# Patient Record
Sex: MALE | Race: WHITE | NOT HISPANIC OR LATINO | Employment: FULL TIME | URBAN - METROPOLITAN AREA
[De-identification: names, ages, dates, MRNs, and addresses within clinical notes are randomized per-mention and may not be internally consistent; named-entity substitution may affect disease eponyms.]

---

## 2022-08-07 ENCOUNTER — OFFICE VISIT (OUTPATIENT)
Dept: URGENT CARE | Facility: URGENT CARE | Age: 42
End: 2022-08-07
Payer: COMMERCIAL

## 2022-08-07 VITALS
DIASTOLIC BLOOD PRESSURE: 82 MMHG | TEMPERATURE: 98.8 F | OXYGEN SATURATION: 97 % | HEART RATE: 73 BPM | SYSTOLIC BLOOD PRESSURE: 130 MMHG

## 2022-08-07 DIAGNOSIS — R21 RASH OF GENITAL AREA: ICD-10-CM

## 2022-08-07 DIAGNOSIS — K13.79 MOUTH SORES: ICD-10-CM

## 2022-08-07 DIAGNOSIS — U07.1 INFECTION DUE TO 2019 NOVEL CORONAVIRUS: Primary | ICD-10-CM

## 2022-08-07 PROCEDURE — 99203 OFFICE O/P NEW LOW 30 MIN: CPT | Performed by: FAMILY MEDICINE

## 2022-08-07 RX ORDER — DIPHENHYDRAMINE HYDROCHLORIDE AND LIDOCAINE HYDROCHLORIDE AND ALUMINUM HYDROXIDE AND MAGNESIUM HYDRO
10 KIT EVERY 6 HOURS PRN
Qty: 237 ML | Refills: 0 | Status: SHIPPED | OUTPATIENT
Start: 2022-08-07 | End: 2022-08-07

## 2022-08-07 RX ORDER — SULFAMETHOXAZOLE/TRIMETHOPRIM 800-160 MG
1 TABLET ORAL 2 TIMES DAILY
Qty: 20 TABLET | Refills: 0 | Status: SHIPPED | OUTPATIENT
Start: 2022-08-07

## 2022-08-07 RX ORDER — SULFAMETHOXAZOLE/TRIMETHOPRIM 800-160 MG
1 TABLET ORAL 2 TIMES DAILY
Qty: 20 TABLET | Refills: 0 | Status: SHIPPED | OUTPATIENT
Start: 2022-08-07 | End: 2022-08-07

## 2022-08-07 RX ORDER — DIPHENHYDRAMINE HYDROCHLORIDE AND LIDOCAINE HYDROCHLORIDE AND ALUMINUM HYDROXIDE AND MAGNESIUM HYDRO
10 KIT EVERY 6 HOURS PRN
Qty: 237 ML | Refills: 0 | Status: SHIPPED | OUTPATIENT
Start: 2022-08-07

## 2022-08-07 NOTE — LETTER
August 7, 2022      Miguel Quiñonez  [address]  [place] [address]        To Whom It May Concern:    Miguel Quiñonez  was seen on 8/7.  Please excuse him from work thru 8/8, he may require extension of work excuse thru 8/13 due to illness, positive COVID infection and mouth sores due to COVID infection.        Sincerely,        Magno Antony MD

## 2022-08-07 NOTE — PROGRESS NOTES
SUBJECTIVE:  Chief Complaint   Patient presents with     Mouth Problem     2 days, roof of mouth swelling/sores, skin on penis flaking off turning black/bluish, hands and feet feels tingly, tested positive for covid on Thursday     Miguel Quiñonez is a 41 year old male who presents with a chief complaint of rash - mouth, genital area.    Home rapid COVID positive on 8/4, exposed to positive COVID the weekend before, started to develop symptoms on Wednesday.  Has cough but has improved.    Developed rash in mouth and in genital area for 2 days.  Has tried lotrimin in the past for rash in genital area and has helped, did seem to be more moist but thinks may be due to taking more baths, skin more raw and feels more tingling.    Has been more stressed, unable to sleep due to pain    No past medical history on file.  No current outpatient medications on file.     Social History     Tobacco Use     Smoking status: Current Every Day Smoker     Smokeless tobacco: Never Used   Substance Use Topics     Alcohol use: Not on file       ROS:  Review of systems negative except as stated above.    EXAM:   /82 (BP Location: Right arm, Patient Position: Sitting, Cuff Size: Adult Regular)   Pulse 73   Temp 98.8  F (37.1  C) (Tympanic)   SpO2 97%   GENERAL APPEARANCE: healthy, alert and no distress  EYES: EOM intact, PERRL, conjunctiva clear  MOUTH: irregular patches on lower lip, upper lip and mucosa in mouth.  No lip or tongue swelling  CHEST: no audible wheezes or increase work of breathing  EXTREMITIES: peripheral pulses normal  SKIN: penis - head of penis along corona edge with denuded patch with slight mattering, slight detracted tissue, tenderness and faint erythema  PSYCH: alert, affect bright      ASSESSMENT/PLAN:  (U07.1) Infection due to 2019 novel coronavirus  (primary encounter diagnosis)  Plan: symptomatic treatment    (K13.79) Mouth sores  Plan: magic mouthwash suspension, diphenhydrAMINE,         lidocaine,  aluminum-magnesium & simethicone,         (FIRST-MOUTHWASH BLM) compounding kit,         DISCONTINUED: magic mouthwash suspension,         diphenhydrAMINE, lidocaine, aluminum-magnesium         & simethicone, (FIRST-MOUTHWASH BLM)         compounding kit            (R21) Rash of genital area  Plan: sulfamethoxazole-trimethoprim (BACTRIM DS)         800-160 MG tablet, DISCONTINUED:         sulfamethoxazole-trimethoprim (BACTRIM DS)         800-160 MG tablet            Patient with COVID-19 infection with skin manifestation in mouth and on genital areas, concerning for mild Donald Marco Antonio like reaction.  Will treat with RX magic mouthwash for now, encourage tylenol and ibuprofen for discomfort.  RX Bactrim DS given for skin wound in genital area concerning more for skin infection.    Work excuse note given  Follow up with primary provider if no improvement of symptoms in 1 week    Magno Antony MD  August 7, 2022 5:28 PM

## 2022-08-07 NOTE — PATIENT INSTRUCTIONS
Okay to take ibuprofen 200 mg - 4 tablets (800 mg) every 8 hours as needed.  Okay to take tylenol 500 mg - 2 tablets (1000 mg) every 6-8 hours as needed, do not exceed 3000 mg in 24 hours.    Use magic mouthwash to help with mouth sores  Take full course of antibiotic for possible skin infection

## 2022-09-20 ENCOUNTER — OFFICE VISIT (OUTPATIENT)
Dept: FAMILY MEDICINE | Facility: CLINIC | Age: 42
End: 2022-09-20
Payer: COMMERCIAL

## 2022-09-20 VITALS
OXYGEN SATURATION: 96 % | HEART RATE: 72 BPM | TEMPERATURE: 97.8 F | WEIGHT: 124 LBS | SYSTOLIC BLOOD PRESSURE: 111 MMHG | DIASTOLIC BLOOD PRESSURE: 75 MMHG | BODY MASS INDEX: 21.17 KG/M2 | HEIGHT: 64 IN

## 2022-09-20 DIAGNOSIS — B02.9 HERPES ZOSTER WITHOUT COMPLICATION: ICD-10-CM

## 2022-09-20 DIAGNOSIS — B09 VIRAL RASH: Primary | ICD-10-CM

## 2022-09-20 LAB
ANION GAP SERPL CALCULATED.3IONS-SCNC: 7 MMOL/L (ref 3–14)
BASOPHILS # BLD AUTO: 0 10E3/UL (ref 0–0.2)
BASOPHILS NFR BLD AUTO: 0 %
BUN SERPL-MCNC: 7 MG/DL (ref 7–30)
CALCIUM SERPL-MCNC: 8.9 MG/DL (ref 8.5–10.1)
CHLORIDE BLD-SCNC: 103 MMOL/L (ref 94–109)
CO2 SERPL-SCNC: 26 MMOL/L (ref 20–32)
CREAT SERPL-MCNC: 1.02 MG/DL (ref 0.66–1.25)
EOSINOPHIL # BLD AUTO: 0.1 10E3/UL (ref 0–0.7)
EOSINOPHIL NFR BLD AUTO: 1 %
ERYTHROCYTE [DISTWIDTH] IN BLOOD BY AUTOMATED COUNT: 13.8 % (ref 10–15)
GFR SERPL CREATININE-BSD FRML MDRD: >90 ML/MIN/1.73M2
GLUCOSE BLD-MCNC: 91 MG/DL (ref 70–99)
HCT VFR BLD AUTO: 44 % (ref 40–53)
HGB BLD-MCNC: 15 G/DL (ref 13.3–17.7)
LYMPHOCYTES # BLD AUTO: 1 10E3/UL (ref 0.8–5.3)
LYMPHOCYTES NFR BLD AUTO: 24 %
MCH RBC QN AUTO: 28.1 PG (ref 26.5–33)
MCHC RBC AUTO-ENTMCNC: 34.1 G/DL (ref 31.5–36.5)
MCV RBC AUTO: 83 FL (ref 78–100)
MONOCYTES # BLD AUTO: 0.5 10E3/UL (ref 0–1.3)
MONOCYTES NFR BLD AUTO: 13 %
NEUTROPHILS # BLD AUTO: 2.6 10E3/UL (ref 1.6–8.3)
NEUTROPHILS NFR BLD AUTO: 62 %
PLATELET # BLD AUTO: 130 10E3/UL (ref 150–450)
POTASSIUM BLD-SCNC: 3.8 MMOL/L (ref 3.4–5.3)
RBC # BLD AUTO: 5.33 10E6/UL (ref 4.4–5.9)
SODIUM SERPL-SCNC: 136 MMOL/L (ref 133–144)
WBC # BLD AUTO: 4.2 10E3/UL (ref 4–11)

## 2022-09-20 PROCEDURE — 99203 OFFICE O/P NEW LOW 30 MIN: CPT

## 2022-09-20 PROCEDURE — 80048 BASIC METABOLIC PNL TOTAL CA: CPT

## 2022-09-20 PROCEDURE — 86696 HERPES SIMPLEX TYPE 2 TEST: CPT | Performed by: PHYSICIAN ASSISTANT

## 2022-09-20 PROCEDURE — 86695 HERPES SIMPLEX TYPE 1 TEST: CPT | Performed by: PHYSICIAN ASSISTANT

## 2022-09-20 PROCEDURE — 36415 COLL VENOUS BLD VENIPUNCTURE: CPT

## 2022-09-20 PROCEDURE — 85025 COMPLETE CBC W/AUTO DIFF WBC: CPT

## 2022-09-20 RX ORDER — VALACYCLOVIR HYDROCHLORIDE 1 G/1
1000 TABLET, FILM COATED ORAL 3 TIMES DAILY
Qty: 21 TABLET | Refills: 0 | Status: SHIPPED | OUTPATIENT
Start: 2022-09-20 | End: 2022-09-27

## 2022-09-20 ASSESSMENT — ENCOUNTER SYMPTOMS
NEUROLOGICAL NEGATIVE: 1
CONSTITUTIONAL NEGATIVE: 1

## 2022-09-20 NOTE — PROGRESS NOTES
"Assessment & Plan     Viral rash  - CBC with platelets differential  - Basic metabolic panel  - Herpes Simplex Virus 1 and 2 IgG  - valACYclovir (VALTREX) 1000 mg tablet  Dispense: 21 tablet; Refill: 0  - CBC with platelets differential  - Basic metabolic panel  - Herpes Simplex Virus 1 and 2 IgG    Herpes zoster without complication     Due to history and recent second outbreak of viral rash, will get basic lab work. At this time, to take Valtrex as directed, monitor for any new or worsening symptoms.     Return if symptoms worsen or fail to improve, for Follow up.    Shannan Rivera is a 41 year old male who presents to clinic today for the following health issues:  Chief Complaint   Patient presents with     Derm Problem     Blister on left inner thigh, spreading redness, blisters         Miguel presents with reports of blisters on his left inner thigh with redness and it is spreading x 3 days. He states 1.5 months ago he had rash in his mouth and genitals, was treated and improved. He reports he has had decreased appetite.           Review of Systems   Constitutional: Negative.    Skin: Positive for rash.   Neurological: Negative.            Objective    /75 (BP Location: Right arm, Patient Position: Chair, Cuff Size: Adult Regular)   Pulse 72   Temp 97.8  F (36.6  C) (Tympanic)   Ht 1.626 m (5' 4\")   Wt 56.2 kg (124 lb)   SpO2 96%   BMI 21.28 kg/m    Physical Exam  Constitutional:       Appearance: Normal appearance.   HENT:      Head: Normocephalic and atraumatic.   Musculoskeletal:         General: Normal range of motion.      Cervical back: Normal range of motion and neck supple.   Skin:     Findings: Rash present. Rash is vesicular.          Neurological:      General: No focal deficit present.      Mental Status: He is alert and oriented to person, place, and time.   Psychiatric:         Mood and Affect: Mood normal.         Behavior: Behavior normal.         Thought Content: Thought content " normal.         Judgment: Judgment normal.              Tyler Valero PA-C

## 2022-09-21 LAB
HSV1 IGG SERPL QL IA: <0.01 INDEX
HSV1 IGG SERPL QL IA: NORMAL
HSV2 IGG SERPL QL IA: 0.05 INDEX
HSV2 IGG SERPL QL IA: NORMAL

## 2022-10-16 ENCOUNTER — HEALTH MAINTENANCE LETTER (OUTPATIENT)
Age: 42
End: 2022-10-16

## 2023-11-04 ENCOUNTER — HEALTH MAINTENANCE LETTER (OUTPATIENT)
Age: 43
End: 2023-11-04

## 2024-12-22 ENCOUNTER — HEALTH MAINTENANCE LETTER (OUTPATIENT)
Age: 44
End: 2024-12-22

## 2025-05-03 ENCOUNTER — HOSPITAL ENCOUNTER (EMERGENCY)
Facility: HOSPITAL | Age: 45
Discharge: HOME | End: 2025-05-03
Attending: EMERGENCY MEDICINE | Admitting: EMERGENCY MEDICINE
Payer: COMMERCIAL

## 2025-05-03 ENCOUNTER — APPOINTMENT (OUTPATIENT)
Dept: CT IMAGING | Facility: HOSPITAL | Age: 45
End: 2025-05-03
Attending: EMERGENCY MEDICINE
Payer: COMMERCIAL

## 2025-05-03 VITALS
RESPIRATION RATE: 28 BRPM | OXYGEN SATURATION: 94 % | SYSTOLIC BLOOD PRESSURE: 123 MMHG | WEIGHT: 138.9 LBS | TEMPERATURE: 98.5 F | DIASTOLIC BLOOD PRESSURE: 78 MMHG | HEART RATE: 94 BPM | BODY MASS INDEX: 23.84 KG/M2

## 2025-05-03 DIAGNOSIS — J18.9 COMMUNITY ACQUIRED PNEUMONIA OF LEFT LOWER LOBE OF LUNG: ICD-10-CM

## 2025-05-03 DIAGNOSIS — R59.1 LYMPHADENOPATHY: ICD-10-CM

## 2025-05-03 LAB
ALBUMIN SERPL BCG-MCNC: 4.1 G/DL (ref 3.5–5.2)
ALP SERPL-CCNC: 223 U/L (ref 40–150)
ALT SERPL W P-5'-P-CCNC: 27 U/L (ref 0–70)
ANION GAP SERPL CALCULATED.3IONS-SCNC: 15 MMOL/L (ref 7–15)
AST SERPL W P-5'-P-CCNC: 22 U/L (ref 0–45)
BASOPHILS # BLD AUTO: 0 10E3/UL (ref 0–0.2)
BASOPHILS NFR BLD AUTO: 0 %
BILIRUB DIRECT SERPL-MCNC: 0.83 MG/DL (ref 0–0.3)
BILIRUB SERPL-MCNC: 1.9 MG/DL
BUN SERPL-MCNC: 10.7 MG/DL (ref 6–20)
CALCIUM SERPL-MCNC: 9.8 MG/DL (ref 8.8–10.4)
CHLORIDE SERPL-SCNC: 93 MMOL/L (ref 98–107)
CREAT SERPL-MCNC: 0.9 MG/DL (ref 0.67–1.17)
D DIMER PPP FEU-MCNC: 1.28 UG/ML FEU (ref 0–0.5)
EGFRCR SERPLBLD CKD-EPI 2021: >90 ML/MIN/1.73M2
EOSINOPHIL # BLD AUTO: 0 10E3/UL (ref 0–0.7)
EOSINOPHIL NFR BLD AUTO: 0 %
ERYTHROCYTE [DISTWIDTH] IN BLOOD BY AUTOMATED COUNT: 14.7 % (ref 10–15)
GLUCOSE SERPL-MCNC: 119 MG/DL (ref 70–99)
HCO3 SERPL-SCNC: 26 MMOL/L (ref 22–29)
HCT VFR BLD AUTO: 42.9 % (ref 40–53)
HGB BLD-MCNC: 13.7 G/DL (ref 13.3–17.7)
HOLD SPECIMEN: NORMAL
IMM GRANULOCYTES # BLD: 0.1 10E3/UL
IMM GRANULOCYTES NFR BLD: 1 %
LIPASE SERPL-CCNC: 104 U/L (ref 13–60)
LYMPHOCYTES # BLD AUTO: 1.1 10E3/UL (ref 0.8–5.3)
LYMPHOCYTES NFR BLD AUTO: 10 %
MCH RBC QN AUTO: 24.3 PG (ref 26.5–33)
MCHC RBC AUTO-ENTMCNC: 31.9 G/DL (ref 31.5–36.5)
MCV RBC AUTO: 76 FL (ref 78–100)
MONOCYTES # BLD AUTO: 0.8 10E3/UL (ref 0–1.3)
MONOCYTES NFR BLD AUTO: 8 %
NEUTROPHILS # BLD AUTO: 8.7 10E3/UL (ref 1.6–8.3)
NEUTROPHILS NFR BLD AUTO: 81 %
NRBC # BLD AUTO: 0 10E3/UL
NRBC BLD AUTO-RTO: 0 /100
PLATELET # BLD AUTO: 210 10E3/UL (ref 150–450)
POTASSIUM SERPL-SCNC: 3.9 MMOL/L (ref 3.4–5.3)
PROT SERPL-MCNC: 6.6 G/DL (ref 6.4–8.3)
RBC # BLD AUTO: 5.63 10E6/UL (ref 4.4–5.9)
SODIUM SERPL-SCNC: 134 MMOL/L (ref 135–145)
TROPONIN T SERPL HS-MCNC: 7 NG/L
WBC # BLD AUTO: 10.7 10E3/UL (ref 4–11)

## 2025-05-03 PROCEDURE — 93005 ELECTROCARDIOGRAM TRACING: CPT | Performed by: EMERGENCY MEDICINE

## 2025-05-03 PROCEDURE — 82248 BILIRUBIN DIRECT: CPT | Performed by: EMERGENCY MEDICINE

## 2025-05-03 PROCEDURE — 71275 CT ANGIOGRAPHY CHEST: CPT

## 2025-05-03 PROCEDURE — 84484 ASSAY OF TROPONIN QUANT: CPT | Performed by: EMERGENCY MEDICINE

## 2025-05-03 PROCEDURE — 85004 AUTOMATED DIFF WBC COUNT: CPT | Performed by: EMERGENCY MEDICINE

## 2025-05-03 PROCEDURE — 80048 BASIC METABOLIC PNL TOTAL CA: CPT | Performed by: EMERGENCY MEDICINE

## 2025-05-03 PROCEDURE — 99285 EMERGENCY DEPT VISIT HI MDM: CPT | Mod: 25

## 2025-05-03 PROCEDURE — 250N000013 HC RX MED GY IP 250 OP 250 PS 637: Performed by: EMERGENCY MEDICINE

## 2025-05-03 PROCEDURE — 85379 FIBRIN DEGRADATION QUANT: CPT | Performed by: EMERGENCY MEDICINE

## 2025-05-03 PROCEDURE — 250N000011 HC RX IP 250 OP 636: Performed by: EMERGENCY MEDICINE

## 2025-05-03 PROCEDURE — 83690 ASSAY OF LIPASE: CPT | Performed by: EMERGENCY MEDICINE

## 2025-05-03 PROCEDURE — 36415 COLL VENOUS BLD VENIPUNCTURE: CPT | Performed by: EMERGENCY MEDICINE

## 2025-05-03 RX ORDER — CEFPODOXIME PROXETIL 200 MG/1
200 TABLET, FILM COATED ORAL ONCE
Status: COMPLETED | OUTPATIENT
Start: 2025-05-03 | End: 2025-05-03

## 2025-05-03 RX ORDER — AZITHROMYCIN 250 MG/1
TABLET, FILM COATED ORAL
Qty: 6 TABLET | Refills: 0 | Status: SHIPPED | OUTPATIENT
Start: 2025-05-03 | End: 2025-05-08

## 2025-05-03 RX ORDER — IOPAMIDOL 755 MG/ML
90 INJECTION, SOLUTION INTRAVASCULAR ONCE
Status: COMPLETED | OUTPATIENT
Start: 2025-05-03 | End: 2025-05-03

## 2025-05-03 RX ORDER — CEFPODOXIME PROXETIL 200 MG/1
200 TABLET, FILM COATED ORAL 2 TIMES DAILY
Qty: 20 TABLET | Refills: 0 | Status: SHIPPED | OUTPATIENT
Start: 2025-05-03 | End: 2025-05-13

## 2025-05-03 RX ORDER — CEFDINIR 300 MG/1
300 CAPSULE ORAL ONCE
Status: DISCONTINUED | OUTPATIENT
Start: 2025-05-03 | End: 2025-05-03

## 2025-05-03 RX ADMIN — CEFPODOXIME PROXETIL 200 MG: 200 TABLET, FILM COATED ORAL at 09:23

## 2025-05-03 RX ADMIN — IOPAMIDOL 90 ML: 755 INJECTION, SOLUTION INTRAVENOUS at 08:00

## 2025-05-03 ASSESSMENT — ACTIVITIES OF DAILY LIVING (ADL)
ADLS_ACUITY_SCORE: 41

## 2025-05-03 NOTE — DISCHARGE INSTRUCTIONS
Our primary care and heme/onc scheduling teams will call you to help you to set up follow up appointments with them so they can reassess you and make sure you are improving on the antibiotic therapy that you started in the ER today and so they can talk with you about whether they recommend any other procedures (a repeat CAT scan or lymph node testing) to make sure you fully recovered from your pneumonia and to make sure you don't have any other abnormalities with your lymph node system.

## 2025-05-03 NOTE — ED PROVIDER NOTES
EMERGENCY DEPARTMENT ENCOUNTER      NAME: Miguel Quiñonez  AGE: 44 year old male  YOB: 1980  MRN: [id number]  EVALUATION DATE & TIME: 5/3/2025  6:48 AM    PCP: No Ref-Primary, Physician    ED PROVIDER: Davida Irby M.D.      Chief Complaint   Patient presents with    Chest Pain    Shortness of Breath         FINAL IMPRESSION:  1. Community acquired pneumonia of left lower lobe of lung    2. Lymphadenopathy          ED COURSE & MEDICAL DECISION MAKING:    ED Course as of 05/03/25 0854   Sat May 03, 2025   0707 Patient with PERC+ moderate risk per Wells PE score for PE, HEART score low risk for ACS, and with moderate risk a negative ddimer would not be sufficient to r/o PE so CTA pending. Troponin testing pending for ACS r/o, and EKG reassuring, chemistry and LFTs/lipase pending additionally.  with stress of triage, now in 90s.    0832 Ddimer elevated and CTA pending, lipase only 104 without focal RUQ pain and LFTs normal and CBC and BMP WNL   0833 CT with likely early developing PNA LLL with hot/cold  feeling started on augmentin with z-pack. With lymphadenopathy, possibly reactive to PNA vs other lymphoproliferative pathology therefore will refer to heme/onc for close serial reassessment and follow up.    0845 Patietn with PCN allergy so will prescribe cefpodozime with z-pack       Pertinent Labs & Imaging studies reviewed. (See chart for details)    Medical Decision Making      Discharge. I prescribed additional prescription strength medication(s) as charted. I considered admission, but discharged patient after significant clinical improvement.    MIPS (CTPE, Dental pain, Duff, Sinusitis, Asthma/COPD, Head Trauma): CT Pulmonary Angiogram:The patient had an abnormal d-dimer.    SEPSIS: None        At the conclusion of the encounter I discussed the results of all of the tests and the disposition. The questions were answered. The patient or family acknowledged understanding and was agreeable with the  care plan.     MEDICATIONS GIVEN IN THE EMERGENCY:  Medications   cefpodoxime (VANTIN) tablet 200 mg (has no administration in time range)   iopamidol (ISOVUE-370) solution 90 mL (90 mLs Intravenous $Given 5/3/25 0800)       NEW PRESCRIPTIONS STARTED AT TODAY'S ER VISIT  New Prescriptions    AZITHROMYCIN (ZITHROMAX Z-JONNY) 250 MG TABLET    Two tablets on the first day, then one tablet daily for the next 4 days    CEFPODOXIME (VANTIN) 200 MG TABLET    Take 1 tablet (200 mg) by mouth 2 times daily for 10 days.          =================================================================    HPI      Miguel Quiñonez is a 44 year old male who presents to the ED today via private vehicle with chest pain.    He reports that since about 3 months ago he has been sleeping somewhat poorly and warm at night, and then on Friday he developed anterior left crampy chest pain radiating into his back, moderately severe, worse with deep breaths and improved with shallow breaths. No prior episodes. No history of blood clots, no recent surgeries or trauma, no leg swelling. He doesn't smoke cigarettes but occasionally smokes THC. Father had MI. He has no history of heart disease.       REVIEW OF SYSTEMS   All other systems reviewed and are negative except as noted above in HPI.    PAST MEDICAL HISTORY:  No past medical history on file.    PAST SURGICAL HISTORY:  No past surgical history on file.    CURRENT MEDICATIONS:    azithromycin (ZITHROMAX Z-JONNY) 250 MG tablet  cefpodoxime (VANTIN) 200 MG tablet  magic mouthwash suspension, diphenhydrAMINE, lidocaine, aluminum-magnesium & simethicone, (FIRST-MOUTHWASH BLM) compounding kit  sulfamethoxazole-trimethoprim (BACTRIM DS) 800-160 MG tablet  valACYclovir (VALTREX) 1000 mg tablet        ALLERGIES:  Allergies   Allergen Reactions    Penicillins Hives       FAMILY HISTORY:  No family history on file.    SOCIAL HISTORY:   Social History     Socioeconomic History    Marital status: Single   Tobacco Use     Smoking status: Every Day    Smokeless tobacco: Never       VITALS:  Patient Vitals for the past 24 hrs:   BP Temp Temp src Pulse Resp SpO2 Weight   05/03/25 0720 120/77 -- -- 97 -- 93 % --   05/03/25 0705 122/76 -- -- 97 28 94 % --   05/03/25 0654 -- 98.5  F (36.9  C) Oral 106 18 98 % 63 kg (138 lb 14.4 oz)       PHYSICAL EXAM    GENERAL: Awake, alert.  In no acute distress.   HEENT: Normocephalic, atraumatic.  Pupils equal, round and reactive.  Conjunctiva normal.  EOMI.  NECK: No stridor or apparent deformity.  PULMONARY: Symmetrical breath sounds without distress.  Lungs clear to auscultation bilaterally without wheezes, rhonchi or rales.  CARDIO: Regular rate and rhythm.  No significant murmur, rub or gallop.  Radial pulses strong and symmetrical.  ABDOMINAL: Abdomen soft, non-distended and non-tender to palpation.  No CVAT, no palpable hepatosplenomegaly.  EXTREMITIES: No lower extremity swelling or edema.    NEURO: Alert and oriented to person, place and time.  Cranial nerves grossly intact.  No focal motor deficit.  PSYCH: Normal mood and affect  SKIN: No rashes      LAB:  All pertinent labs reviewed and interpreted.  Results for orders placed or performed during the hospital encounter of 05/03/25   CT Chest Pulmonary Embolism w Contrast    Impression    IMPRESSION:  1.  No convincing pulmonary embolus.  2.  Left lower lobe pneumonia with small left pleural effusion.  3.  Moderate splenomegaly and upper abdominal/retroperitoneal lymphadenopathy likely reflecting a lymphoproliferative process, although other neoplastic etiologies are possible.   4.  Left hilar and subcarinal lymphadenopathy could be reactive versus associated to Impression#3.      Extra Blue Top Tube   Result Value Ref Range    Hold Specimen JIC    Extra Red Top Tube   Result Value Ref Range    Hold Specimen JIC    Extra Green Top (Lithium Heparin) Tube   Result Value Ref Range    Hold Specimen JIC    Extra Purple Top Tube   Result Value  Ref Range    Hold Specimen [facility]    D dimer quantitative   Result Value Ref Range    D-Dimer Quantitative 1.28 (H) 0.00 - 0.50 ug/mL FEU   Basic metabolic panel   Result Value Ref Range    Sodium 134 (L) 135 - 145 mmol/L    Potassium 3.9 3.4 - 5.3 mmol/L    Chloride 93 (L) 98 - 107 mmol/L    Carbon Dioxide (CO2) 26 22 - 29 mmol/L    Anion Gap 15 7 - 15 mmol/L    Urea Nitrogen 10.7 6.0 - 20.0 mg/dL    Creatinine 0.90 0.67 - 1.17 mg/dL    GFR Estimate >90 >60 mL/min/1.73m2    Calcium 9.8 8.8 - 10.4 mg/dL    Glucose 119 (H) 70 - 99 mg/dL   Result Value Ref Range    Troponin T, High Sensitivity 7 <=22 ng/L   Result Value Ref Range    Lipase 104 (H) 13 - 60 U/L   Hepatic function panel   Result Value Ref Range    Protein Total 6.6 6.4 - 8.3 g/dL    Albumin 4.1 3.5 - 5.2 g/dL    Bilirubin Total 1.9 (H) <=1.2 mg/dL    Alkaline Phosphatase 223 (H) 40 - 150 U/L    AST 22 0 - 45 U/L    ALT 27 0 - 70 U/L    Bilirubin Direct 0.83 (H) 0.00 - 0.30 mg/dL   CBC with platelets and differential   Result Value Ref Range    WBC Count 10.7 4.0 - 11.0 10e3/uL    RBC Count 5.63 4.40 - 5.90 10e6/uL    Hemoglobin 13.7 13.3 - 17.7 g/dL    Hematocrit 42.9 40.0 - 53.0 %    MCV 76 (L) 78 - 100 fL    MCH 24.3 (L) 26.5 - 33.0 pg    MCHC 31.9 31.5 - 36.5 g/dL    RDW 14.7 10.0 - 15.0 %    Platelet Count 210 150 - 450 10e3/uL    % Neutrophils 81 %    % Lymphocytes 10 %    % Monocytes 8 %    % Eosinophils 0 %    % Basophils 0 %    % Immature Granulocytes 1 %    NRBCs per 100 WBC 0 <1 /100    Absolute Neutrophils 8.7 (H) 1.6 - 8.3 10e3/uL    Absolute Lymphocytes 1.1 0.8 - 5.3 10e3/uL    Absolute Monocytes 0.8 0.0 - 1.3 10e3/uL    Absolute Eosinophils 0.0 0.0 - 0.7 10e3/uL    Absolute Basophils 0.0 0.0 - 0.2 10e3/uL    Absolute Immature Granulocytes 0.1 <=0.4 10e3/uL    Absolute NRBCs 0.0 10e3/uL       RADIOLOGY:  Reviewed all pertinent imaging. Please see official radiology report.  CT Chest Pulmonary Embolism w Contrast   Final Result   IMPRESSION:    1.  No convincing pulmonary embolus.   2.  Left lower lobe pneumonia with small left pleural effusion.   3.  Moderate splenomegaly and upper abdominal/retroperitoneal lymphadenopathy likely reflecting a lymphoproliferative process, although other neoplastic etiologies are possible.    4.  Left hilar and subcarinal lymphadenopathy could be reactive versus associated to Impression#3.                EKG:    Reviewed and interpreted as: 0708 normal sinus rhythm without ST abnormalities, , no prior EKG available for comparison purposes      I have independently reviewed and interpreted the EKG(s) documented above.       Davida Irby MD  05/03/25 0883

## 2025-05-03 NOTE — ED TRIAGE NOTES
Patient reports 9/10 crushing left sided chest pain that radiates into back, shortness of breath, nausea. Has been having similar episodes since February. This episode originally started Thursday but has been getting worse and has not gone away.     Triage Assessment (Adult)       Row Name 05/03/25 0651          Triage Assessment    Airway WDL WDL        Respiratory WDL    Respiratory WDL X;rhythm/pattern     Rhythm/Pattern, Respiratory shortness of breath  hard to take deep breath        Cardiac WDL    Cardiac WDL X;chest pain        Chest Pain Assessment    Chest Pain Location anterior chest, left     Chest Pain Radiation back     Character crushing

## 2025-05-05 ENCOUNTER — PATIENT OUTREACH (OUTPATIENT)
Dept: ONCOLOGY | Facility: CLINIC | Age: 45
End: 2025-05-05
Payer: COMMERCIAL

## 2025-05-05 NOTE — PROGRESS NOTES
New Patient Oncology Nurse Navigator Note     Referral Received: 05/05/25      Referring provider: Davida Irby MD     Referring Clinic/Organization: [facility]     Referred to: Malignant Hematology    Requested provider (if applicable): First available - did not specify      Evaluation for :   Diagnosis   R59.1 (ICD-10-CM) - Lymphadenopathy      Clinical History (per Nurse review of records provided):      ED COURSE & MEDICAL DECISION MAKING:        ED Course as of 05/03/25 0854   Sat May 03, 2025   0707 Patient with PERC+ moderate risk per Wells PE score for PE, HEART score low risk for ACS, and with moderate risk a negative ddimer would not be sufficient to r/o PE so CTA pending. Troponin testing pending for ACS r/o, and EKG reassuring, chemistry and LFTs/lipase pending additionally.  with stress of triage, now in 90s.    0832 Ddimer elevated and CTA pending, lipase only 104 without focal RUQ pain and LFTs normal and CBC and BMP WNL   0833 CT with likely early developing PNA LLL with hot/cold  feeling started on augmentin with z-pack. With lymphadenopathy, possibly reactive to PNA vs other lymphoproliferative pathology therefore will refer to heme/onc for close serial reassessment and follow up.    0845 Patietn with PCN allergy so will prescribe cefpodozime with z-pack     EXAM: CT CHEST PULMONARY EMBOLISM WITH CONTRAST  LOCATION: [facility]  DATE: 05/03/2025     INDICATION: Rule out PE left chest.  COMPARISON: None.  TECHNIQUE: CT chest pulmonary angiogram during arterial phase injection of IV contrast. Multiplanar reformats and MIP reconstructions were performed. Dose reduction techniques were used.   CONTRAST: 90 mL of Isovue-370.     FINDINGS:  ANGIOGRAM CHEST: No convincing pulmonary embolus. Thoracic aorta is nonaneurysmal.       LUNGS AND PLEURA: Moderate left lower lobe consolidative opacity with areas that are hypoenhancing. Small left pleural effusion. No  pneumothorax.     MEDIASTINUM/AXILLAE: Trace debris in the trachea. Mildly enlarged left hilar and subcarinal lymph nodes.     CORONARY ARTERY CALCIFICATION: None.     UPPER ABDOMEN: Moderate splenomegaly measuring at least 16 cm craniocaudal dimension. Multiple enlarged upper abdominal and retroperitoneal lymph nodes, for example, a left periaortic lymph node measuring 1.7 x 3.6 cm (5/107).     MUSCULOSKELETAL: Normal.                                                                    IMPRESSION:  1.  No convincing pulmonary embolus.  2.  Left lower lobe pneumonia with small left pleural effusion.  3.  Moderate splenomegaly and upper abdominal/retroperitoneal lymphadenopathy likely reflecting a lymphoproliferative process, although other neoplastic etiologies are possible.   4.  Left hilar and subcarinal lymphadenopathy could be reactive versus associated to Impression#3    Records Location: Epic     Additional testing needed prior to consult:     ?    Referral updates and Plan:     05/05/2025 10:00 AM -  Referral received and reviewed. Attempted to call pt at phone number listed, first number the person answered and stated wrong number.  Left message on mobile.  Sent to NPS to schedule next available after 2 weeks.     DK BrewerN, RN  Hematology/Oncology Nurse Navigator  [facility]  [phone] / [phone]

## 2025-05-13 NOTE — TELEPHONE ENCOUNTER
RECORDS STATUS - ALL OTHER DIAGNOSIS      RECORDS RECEIVED FROM: [facility] - Internal records   DATE RECEIVED: 5/13

## 2025-05-21 ENCOUNTER — PRE VISIT (OUTPATIENT)
Dept: ONCOLOGY | Facility: HOSPITAL | Age: 45
End: 2025-05-21
Payer: COMMERCIAL

## 2025-05-21 ENCOUNTER — ONCOLOGY VISIT (OUTPATIENT)
Dept: ONCOLOGY | Facility: HOSPITAL | Age: 45
End: 2025-05-21
Attending: EMERGENCY MEDICINE
Payer: COMMERCIAL

## 2025-05-21 ENCOUNTER — LAB (OUTPATIENT)
Dept: INFUSION THERAPY | Facility: HOSPITAL | Age: 45
End: 2025-05-21
Attending: INTERNAL MEDICINE
Payer: COMMERCIAL

## 2025-05-21 VITALS
HEART RATE: 92 BPM | WEIGHT: 135.7 LBS | OXYGEN SATURATION: 96 % | BODY MASS INDEX: 22.61 KG/M2 | RESPIRATION RATE: 16 BRPM | HEIGHT: 65 IN | DIASTOLIC BLOOD PRESSURE: 85 MMHG | TEMPERATURE: 99 F | SYSTOLIC BLOOD PRESSURE: 130 MMHG

## 2025-05-21 DIAGNOSIS — R59.1 LYMPHADENOPATHY: ICD-10-CM

## 2025-05-21 LAB
ALBUMIN SERPL BCG-MCNC: 4.2 G/DL (ref 3.5–5.2)
ALP SERPL-CCNC: 208 U/L (ref 40–150)
ALT SERPL W P-5'-P-CCNC: 20 U/L (ref 0–70)
ANION GAP SERPL CALCULATED.3IONS-SCNC: 14 MMOL/L (ref 7–15)
AST SERPL W P-5'-P-CCNC: 16 U/L (ref 0–45)
BASOPHILS # BLD AUTO: 0 10E3/UL (ref 0–0.2)
BASOPHILS NFR BLD AUTO: 1 %
BILIRUB SERPL-MCNC: 0.4 MG/DL
BUN SERPL-MCNC: 7.5 MG/DL (ref 6–20)
CALCIUM SERPL-MCNC: 9.5 MG/DL (ref 8.8–10.4)
CHLORIDE SERPL-SCNC: 101 MMOL/L (ref 98–107)
CREAT SERPL-MCNC: 0.92 MG/DL (ref 0.67–1.17)
EGFRCR SERPLBLD CKD-EPI 2021: >90 ML/MIN/1.73M2
EOSINOPHIL # BLD AUTO: 0.1 10E3/UL (ref 0–0.7)
EOSINOPHIL NFR BLD AUTO: 3 %
ERYTHROCYTE [DISTWIDTH] IN BLOOD BY AUTOMATED COUNT: 14.7 % (ref 10–15)
GLUCOSE SERPL-MCNC: 89 MG/DL (ref 70–99)
HCO3 SERPL-SCNC: 27 MMOL/L (ref 22–29)
HCT VFR BLD AUTO: 42.5 % (ref 40–53)
HGB BLD-MCNC: 13.6 G/DL (ref 13.3–17.7)
IMM GRANULOCYTES # BLD: 0 10E3/UL
IMM GRANULOCYTES NFR BLD: 0 %
LDH SERPL L TO P-CCNC: 169 U/L (ref 0–250)
LYMPHOCYTES # BLD AUTO: 1.2 10E3/UL (ref 0.8–5.3)
LYMPHOCYTES NFR BLD AUTO: 33 %
MCH RBC QN AUTO: 24.2 PG (ref 26.5–33)
MCHC RBC AUTO-ENTMCNC: 32 G/DL (ref 31.5–36.5)
MCV RBC AUTO: 76 FL (ref 78–100)
MONOCYTES # BLD AUTO: 0.3 10E3/UL (ref 0–1.3)
MONOCYTES NFR BLD AUTO: 9 %
NEUTROPHILS # BLD AUTO: 2 10E3/UL (ref 1.6–8.3)
NEUTROPHILS NFR BLD AUTO: 55 %
NRBC # BLD AUTO: 0 10E3/UL
NRBC BLD AUTO-RTO: 0 /100
PLATELET # BLD AUTO: 237 10E3/UL (ref 150–450)
POTASSIUM SERPL-SCNC: 3.8 MMOL/L (ref 3.4–5.3)
PROT SERPL-MCNC: 6.1 G/DL (ref 6.4–8.3)
RBC # BLD AUTO: 5.62 10E6/UL (ref 4.4–5.9)
SODIUM SERPL-SCNC: 142 MMOL/L (ref 135–145)
WBC # BLD AUTO: 3.6 10E3/UL (ref 4–11)

## 2025-05-21 PROCEDURE — 99214 OFFICE O/P EST MOD 30 MIN: CPT | Performed by: INTERNAL MEDICINE

## 2025-05-21 PROCEDURE — G2211 COMPLEX E/M VISIT ADD ON: HCPCS | Performed by: INTERNAL MEDICINE

## 2025-05-21 PROCEDURE — 80053 COMPREHEN METABOLIC PANEL: CPT

## 2025-05-21 PROCEDURE — 85004 AUTOMATED DIFF WBC COUNT: CPT

## 2025-05-21 PROCEDURE — 99204 OFFICE O/P NEW MOD 45 MIN: CPT | Performed by: INTERNAL MEDICINE

## 2025-05-21 PROCEDURE — 36415 COLL VENOUS BLD VENIPUNCTURE: CPT

## 2025-05-21 PROCEDURE — 83615 LACTATE (LD) (LDH) ENZYME: CPT

## 2025-05-21 ASSESSMENT — PAIN SCALES - GENERAL: PAINLEVEL_OUTOF10: MILD PAIN (3)

## 2025-05-21 NOTE — PROGRESS NOTES
"Oncology Rooming Note    May 21, 2025 1:18 PM   Miguel Quiñonez is a 44 year old male who presents for:    Chief Complaint   Patient presents with    Oncology Clinic Visit     New Oncology-Lymphadenopathy.      Initial Vitals: /85 (BP Location: Left arm, Patient Position: Sitting, Cuff Size: Adult Regular)   Pulse 92   Temp 99  F (37.2  C) (Oral)   Resp 16   Ht 1.651 m (5' 5\")   Wt 61.6 kg (135 lb 11.2 oz)   SpO2 96%   BMI 22.58 kg/m   Estimated body mass index is 22.58 kg/m  as calculated from the following:    Height as of this encounter: 1.651 m (5' 5\").    Weight as of this encounter: 61.6 kg (135 lb 11.2 oz). Body surface area is 1.68 meters squared.  Mild Pain (3) Comment: Data Unavailable   No LMP for male patient.  Allergies reviewed: Yes  Medications reviewed: Yes    Medications: Medication refills not needed today.  Pharmacy name entered into [facility]:    [id number] - [place], [place] - [address] AT [id number] - [place], [place] - [facility] [address] [address] AT [address] [address] & [facility]    Frailty Screening:   Is the patient here for a new oncology consult visit in cancer care? 1. Yes. Over the past month, have you experienced difficulty or required a caregiver to assist with:   1. Balance, walking or general mobility (including any falls)? NO  2. Completion of self-care tasks such as bathing, dressing, toileting, grooming/hygiene?  NO  3. Concentration or memory that affects your daily life?  NO     PHQ9:  Did this patient require a PHQ9?: No      Clinical concerns: none       Pascale Ansari, ADELAIDA              "

## 2025-05-21 NOTE — PROGRESS NOTES
[facility] Hematology and Oncology Consult Note    Patient: Miguel Quiñonez  MRN: [id number]  Date of Service: May 21, 2025      We have been asked by Dr. Irby to evaluate Miguel Quiñonez for lymphadenopathy.    Assessment/Plan:    1.  Lymphadenopathy and splenomegaly: I reviewed his CT scan images from 23rd personally.  I shared them with Miguel and interpreted them for him.  We talked about the possible etiologies splenomegaly and lymphadenopathy of which there are many.  We are going to start our evaluation with serologic testing and a PET scan.  I told him that ultimately he may need a biopsy of one of the abdominal nodes.  I reviewed his CBC from 8/3 which shows a white count of 10.7, hemoglobin 13.7 and platelets 200,000.  Does not appear to have any symptomatic bone marrow involvement from anything at this point.  Clinically he is feeling well.  Better than he was when he presented on 23rd.  He was diagnosed with pneumonia and finished a course of antibiotics.  Will see him a few days after his PET scan to make a plan going forward.    Medical decision Making:  Patient has findings which may represent a malignant lymphoproliferative disorder    Labs/Tests ordered this visit:  PET scan, various other blood tests.    ECOG Performance  0    History:    Miguel is a 44-year-old gentleman who is referred today for an evaluation of lymphadenopathy and splenomegaly.  He presented to the emergency room on May 3 shortness of breath and chest pain.  His CT scan PE protocol which showed moderate splenomegaly measuring 16 cm and multiple enlarged upper abdominal and retroperitoneal lymph nodes with the largest measuring 17 x 36 mm.  There was no pulmonary embolism.  He was diagnosed with a left lower lobe pneumonia and started on antibiotics.  CBC at that time was drawn and showed a white count of 10.0, hemoglobin 13.7, meeds 0.76 and platelets 210,000.  He was referred for evaluation.  He is feeling much better after taking  a course of antibiotics prescribed in the emergency department.  His chest pain is basically resolved.  Subjective fevers he was having Also resolved.  Throughout this process his weight has been stable.  No chills or night sweats.    Past History:    No past medical history on file.     No family history on file.  His mother passed away from breast cancer.  He also thinks his father had cancer but was unsure if he  from this.     [unfilled] Social History     Socioeconomic History    Marital status: Single     Spouse name: Not on file    Number of children: Not on file    Years of education: Not on file    Highest education level: Not on file   Occupational History    Not on file   Tobacco Use    Smoking status: Every Day    Smokeless tobacco: Never   Substance and Sexual Activity    Alcohol use: Not on file    Drug use: Not on file    Sexual activity: Not on file   Other Topics Concern    Not on file   Social History Narrative    Not on file     Social Drivers of Health     Financial Resource Strain: Not on file   Food Insecurity: Not on file   Transportation Needs: Not on file   Physical Activity: Not on file   Stress: Not on file   Social Connections: Not on file   Interpersonal Safety: Not on file   Housing Stability: Not on file        Allergies:    Allergies   Allergen Reactions    Penicillins Hives    Tree Nuts [Nuts] Hives     Review of Systems:    As above in the history.     Review of Systems otherwise Negative for:  General: chills, fever or night sweats  Psychological: anxiety or depression  Ophthalmic: blurry vision, double vision or loss of vision, vision change  ENT: epistaxis, oral lesions, hearing changes  Hematological and Lymphatic: bleeding, bruising, jaundice, swollen lymph nodes  Endocrine: hot flashes, unexpected weight changes  Respiratory: hemoptysis, orthopnea or shortness of breath/LOPEZ  Cardiovascular: chest pain, edema, palpitations or PND  Gastrointestinal: abdominal pain, blood in  "stools, change in bowel habits, constipation, diarrhea or nausea/vomiting  Genito-Urinary: change in urinary stream, incontinence, frequency/urgency  Musculoskeletal: joint pain, stiffness, swelling, muscle pain  Neurological: dizziness, headaches, numbness/tingling  Dermatological: lumps and rash    Physical Exam:    /85 (BP Location: Left arm, Patient Position: Sitting, Cuff Size: Adult Regular)   Pulse 92   Temp 99  F (37.2  C) (Oral)   Resp 16   Ht 1.651 m (5' 5\")   Wt 61.6 kg (135 lb 11.2 oz)   SpO2 96%   BMI 22.58 kg/m      General: patient appears stated age of 44 year old. Nontoxic and in no distress.   HEENT: Head: atraumatic, normocephalic. Sclerae anicteric.  Chest:  Normal respiratory effort  Cardiac:  No edema.   Abdomen: abdomen is non-distended  Extremities: normal tone and muscle bulk.   Skin: no lesions or rash on visible skin. Warm and dry.   CNS: alert and oriented. Grossly non-focal.   Psychiatric: normal mood and affect.     Lab Results:    No results found for this or any previous visit (from the past week).    Imaging Results:    CT Chest Pulmonary Embolism w Contrast  Result Date: 5/3/2025  EXAM: CT CHEST PULMONARY EMBOLISM WITH CONTRAST LOCATION: [facility] DATE: 05/03/2025 INDICATION: Rule out PE left chest. COMPARISON: None. TECHNIQUE: CT chest pulmonary angiogram during arterial phase injection of IV contrast. Multiplanar reformats and MIP reconstructions were performed. Dose reduction techniques were used. CONTRAST: 90 mL of Isovue-370. FINDINGS: ANGIOGRAM CHEST: No convincing pulmonary embolus. Thoracic aorta is nonaneurysmal.  LUNGS AND PLEURA: Moderate left lower lobe consolidative opacity with areas that are hypoenhancing. Small left pleural effusion. No pneumothorax. MEDIASTINUM/AXILLAE: Trace debris in the trachea. Mildly enlarged left hilar and subcarinal lymph nodes. CORONARY ARTERY CALCIFICATION: None. UPPER ABDOMEN: Moderate splenomegaly " measuring at least 16 cm craniocaudal dimension. Multiple enlarged upper abdominal and retroperitoneal lymph nodes, for example, a left periaortic lymph node measuring 1.7 x 3.6 cm (5/107). MUSCULOSKELETAL: Normal.     IMPRESSION: 1.  No convincing pulmonary embolus. 2.  Left lower lobe pneumonia with small left pleural effusion. 3.  Moderate splenomegaly and upper abdominal/retroperitoneal lymphadenopathy likely reflecting a lymphoproliferative process, although other neoplastic etiologies are possible. 4.  Left hilar and subcarinal lymphadenopathy could be reactive versus associated to Impression#3.       Signed by: Abdelrahman Faust MD

## 2025-05-21 NOTE — LETTER
5/21/2025      Miguel Quiñonez  [address] [address]      Dear Colleague,    Thank you for referring your patient, Miguel Quiñonez, to the [facility] [place]. Please see a copy of my visit note below.    [facility] Hematology and Oncology Consult Note    Patient: Miguel Quiñonez  MRN: [id number]  Date of Service: May 21, 2025      We have been asked by Dr. Irby to evaluate Miguel Quiñonez for lymphadenopathy.    Assessment/Plan:    1.  Lymphadenopathy and splenomegaly: I reviewed his CT scan images from 23rd personally.  I shared them with Miguel and interpreted them for him.  We talked about the possible etiologies splenomegaly and lymphadenopathy of which there are many.  We are going to start our evaluation with serologic testing and a PET scan.  I told him that ultimately he may need a biopsy of one of the abdominal nodes.  I reviewed his CBC from 8/3 which shows a white count of 10.7, hemoglobin 13.7 and platelets 200,000.  Does not appear to have any symptomatic bone marrow involvement from anything at this point.  Clinically he is feeling well.  Better than he was when he presented on 23rd.  He was diagnosed with pneumonia and finished a course of antibiotics.  Will see him a few days after his PET scan to make a plan going forward.    Medical decision Making:  Patient has findings which may represent a malignant lymphoproliferative disorder    Labs/Tests ordered this visit:  PET scan, various other blood tests.    ECOG Performance  0    History:    Miguel is a 44-year-old gentleman who is referred today for an evaluation of lymphadenopathy and splenomegaly.  He presented to the emergency room on May 3 shortness of breath and chest pain.  His CT scan PE protocol which showed moderate splenomegaly measuring 16 cm and multiple enlarged upper abdominal and retroperitoneal lymph nodes with the largest measuring 17 x 36 mm.  There was no pulmonary embolism.  He was diagnosed  with a left lower lobe pneumonia and started on antibiotics.  CBC at that time was drawn and showed a white count of 10.0, hemoglobin 13.7, meeds 0.76 and platelets 210,000.  He was referred for evaluation.  He is feeling much better after taking a course of antibiotics prescribed in the emergency department.  His chest pain is basically resolved.  Subjective fevers he was having Also resolved.  Throughout this process his weight has been stable.  No chills or night sweats.    Past History:    No past medical history on file.     No family history on file.  His mother passed away from breast cancer.  He also thinks his father had cancer but was unsure if he  from this.     [unfilled] Social History     Socioeconomic History     Marital status: Single     Spouse name: Not on file     Number of children: Not on file     Years of education: Not on file     Highest education level: Not on file   Occupational History     Not on file   Tobacco Use     Smoking status: Every Day     Smokeless tobacco: Never   Substance and Sexual Activity     Alcohol use: Not on file     Drug use: Not on file     Sexual activity: Not on file   Other Topics Concern     Not on file   Social History Narrative     Not on file     Social Drivers of Health     Financial Resource Strain: Not on file   Food Insecurity: Not on file   Transportation Needs: Not on file   Physical Activity: Not on file   Stress: Not on file   Social Connections: Not on file   Interpersonal Safety: Not on file   Housing Stability: Not on file        Allergies:    Allergies   Allergen Reactions     Penicillins Hives     Tree Nuts [Nuts] Hives     Review of Systems:    As above in the history.     Review of Systems otherwise Negative for:  General: chills, fever or night sweats  Psychological: anxiety or depression  Ophthalmic: blurry vision, double vision or loss of vision, vision change  ENT: epistaxis, oral lesions, hearing changes  Hematological and Lymphatic:  "bleeding, bruising, jaundice, swollen lymph nodes  Endocrine: hot flashes, unexpected weight changes  Respiratory: hemoptysis, orthopnea or shortness of breath/LOPEZ  Cardiovascular: chest pain, edema, palpitations or PND  Gastrointestinal: abdominal pain, blood in stools, change in bowel habits, constipation, diarrhea or nausea/vomiting  Genito-Urinary: change in urinary stream, incontinence, frequency/urgency  Musculoskeletal: joint pain, stiffness, swelling, muscle pain  Neurological: dizziness, headaches, numbness/tingling  Dermatological: lumps and rash    Physical Exam:    /85 (BP Location: Left arm, Patient Position: Sitting, Cuff Size: Adult Regular)   Pulse 92   Temp 99  F (37.2  C) (Oral)   Resp 16   Ht 1.651 m (5' 5\")   Wt 61.6 kg (135 lb 11.2 oz)   SpO2 96%   BMI 22.58 kg/m      General: patient appears stated age of 44 year old. Nontoxic and in no distress.   HEENT: Head: atraumatic, normocephalic. Sclerae anicteric.  Chest:  Normal respiratory effort  Cardiac:  No edema.   Abdomen: abdomen is non-distended  Extremities: normal tone and muscle bulk.   Skin: no lesions or rash on visible skin. Warm and dry.   CNS: alert and oriented. Grossly non-focal.   Psychiatric: normal mood and affect.     Lab Results:    No results found for this or any previous visit (from the past week).    Imaging Results:    CT Chest Pulmonary Embolism w Contrast  Result Date: 5/3/2025  EXAM: CT CHEST PULMONARY EMBOLISM WITH CONTRAST LOCATION: [facility] DATE: 05/03/2025 INDICATION: Rule out PE left chest. COMPARISON: None. TECHNIQUE: CT chest pulmonary angiogram during arterial phase injection of IV contrast. Multiplanar reformats and MIP reconstructions were performed. Dose reduction techniques were used. CONTRAST: 90 mL of Isovue-370. FINDINGS: ANGIOGRAM CHEST: No convincing pulmonary embolus. Thoracic aorta is nonaneurysmal.  LUNGS AND PLEURA: Moderate left lower lobe consolidative " "opacity with areas that are hypoenhancing. Small left pleural effusion. No pneumothorax. MEDIASTINUM/AXILLAE: Trace debris in the trachea. Mildly enlarged left hilar and subcarinal lymph nodes. CORONARY ARTERY CALCIFICATION: None. UPPER ABDOMEN: Moderate splenomegaly measuring at least 16 cm craniocaudal dimension. Multiple enlarged upper abdominal and retroperitoneal lymph nodes, for example, a left periaortic lymph node measuring 1.7 x 3.6 cm (5/107). MUSCULOSKELETAL: Normal.     IMPRESSION: 1.  No convincing pulmonary embolus. 2.  Left lower lobe pneumonia with small left pleural effusion. 3.  Moderate splenomegaly and upper abdominal/retroperitoneal lymphadenopathy likely reflecting a lymphoproliferative process, although other neoplastic etiologies are possible. 4.  Left hilar and subcarinal lymphadenopathy could be reactive versus associated to Impression#3.       Signed by: Abdelrahman Faust MD      Oncology Rooming Note    May 21, 2025 1:18 PM   Miguel Quiñonez is a 44 year old male who presents for:    Chief Complaint   Patient presents with     Oncology Clinic Visit     New Oncology-Lymphadenopathy.      Initial Vitals: /85 (BP Location: Left arm, Patient Position: Sitting, Cuff Size: Adult Regular)   Pulse 92   Temp 99  F (37.2  C) (Oral)   Resp 16   Ht 1.651 m (5' 5\")   Wt 61.6 kg (135 lb 11.2 oz)   SpO2 96%   BMI 22.58 kg/m   Estimated body mass index is 22.58 kg/m  as calculated from the following:    Height as of this encounter: 1.651 m (5' 5\").    Weight as of this encounter: 61.6 kg (135 lb 11.2 oz). Body surface area is 1.68 meters squared.  Mild Pain (3) Comment: Data Unavailable   No LMP for male patient.  Allergies reviewed: Yes  Medications reviewed: Yes    Medications: Medication refills not needed today.  Pharmacy name entered into [facility]:    [id number] - [place] - [address] [id number] - [place], [place] - [address]    Frailty Screening:   Is the patient here for a new oncology consult visit in cancer care? 1. Yes. Over the past month, have you experienced difficulty or required a caregiver to assist with:   1. Balance, walking or general mobility (including any falls)? NO  2. Completion of self-care tasks such as bathing, dressing, toileting, grooming/hygiene?  NO  3. Concentration or memory that affects your daily life?  NO     PHQ9:  Did this patient require a PHQ9?: No      Clinical concerns: none       Pascale Ansari CMA                Again, thank you for allowing me to participate in the care of your patient.        Sincerely,        Abdelrahman Faust MD    Electronically signed

## 2025-05-22 ENCOUNTER — PATIENT OUTREACH (OUTPATIENT)
Dept: ONCOLOGY | Facility: HOSPITAL | Age: 45
End: 2025-05-22
Payer: COMMERCIAL

## 2025-06-19 ENCOUNTER — HOSPITAL ENCOUNTER (OUTPATIENT)
Dept: PET IMAGING | Facility: HOSPITAL | Age: 45
End: 2025-06-19
Attending: INTERNAL MEDICINE
Payer: COMMERCIAL

## 2025-06-19 DIAGNOSIS — R59.1 LYMPHADENOPATHY: ICD-10-CM

## 2025-06-19 LAB — GLUCOSE BLDC GLUCOMTR-MCNC: 85 MG/DL (ref 70–99)

## 2025-06-19 PROCEDURE — 82962 GLUCOSE BLOOD TEST: CPT

## 2025-06-19 PROCEDURE — 343N000001 HC RX 343 MED OP 636: Performed by: INTERNAL MEDICINE

## 2025-06-19 PROCEDURE — A9552 F18 FDG: HCPCS | Performed by: INTERNAL MEDICINE

## 2025-06-19 PROCEDURE — 78816 PET IMAGE W/CT FULL BODY: CPT | Mod: PI

## 2025-06-19 RX ORDER — FLUDEOXYGLUCOSE F 18 200 MCI/ML
7-16 INJECTION, SOLUTION INTRAVENOUS ONCE
Status: COMPLETED | OUTPATIENT
Start: 2025-06-19 | End: 2025-06-19

## 2025-06-19 RX ADMIN — FLUDEOXYGLUCOSE F 18 10.18 MILLICURIE: 200 INJECTION, SOLUTION INTRAVENOUS at 06:33

## 2025-07-08 ENCOUNTER — ONCOLOGY VISIT (OUTPATIENT)
Dept: ONCOLOGY | Facility: HOSPITAL | Age: 45
End: 2025-07-08
Attending: INTERNAL MEDICINE
Payer: COMMERCIAL

## 2025-07-08 VITALS
DIASTOLIC BLOOD PRESSURE: 79 MMHG | OXYGEN SATURATION: 97 % | HEART RATE: 68 BPM | TEMPERATURE: 98.7 F | RESPIRATION RATE: 16 BRPM | SYSTOLIC BLOOD PRESSURE: 121 MMHG | WEIGHT: 137.1 LBS | BODY MASS INDEX: 22.81 KG/M2

## 2025-07-08 DIAGNOSIS — R59.1 LYMPHADENOPATHY: Primary | ICD-10-CM

## 2025-07-08 PROCEDURE — 99214 OFFICE O/P EST MOD 30 MIN: CPT | Performed by: INTERNAL MEDICINE

## 2025-07-08 PROCEDURE — G2211 COMPLEX E/M VISIT ADD ON: HCPCS | Performed by: INTERNAL MEDICINE

## 2025-07-08 ASSESSMENT — PAIN SCALES - GENERAL: PAINLEVEL_OUTOF10: NO PAIN (0)

## 2025-07-08 NOTE — H&P (VIEW-ONLY)
[facility] Hematology and Oncology Progress Note    Patient: Miguel Quiñonez  MRN: [id number]  Date of Service: Jul 8, 2025        Assessment and Plan:    1.  Lymphadenopathy and splenomegaly: He had a PET scan on June 19.  I personally reviewed the images and shared them with Miguel and interpreted them for him.  Findings are most suggestive of a lymphoma.  Step will be to get a core biopsy of one of the lymph nodes.  Referral will be placed to interventional radiology in this regard.  Will see the patient back in clinic 1 week after the biopsy to review the results and make a plan going forward.  Would expect this is something like CLL/SLL or follicular lymphoma.  This was reviewed with the patient.  The rationale for biopsy was explained.  He agrees to proceed.  Will see him back in clinic about a week after his biopsy to review results and make a plan going forward.  Questions were answered.    Medical decision Making:  I spent 36 minutes in the care of this patient today, which included time necessary for preparation for the visit, face to face time with the patient, communication of recommendations to the care team, and documentation time.    ECOG Performance  1    Diagnosis:    1.  Lymphadenopathy    Treatment:    None to date    Interim History:    Miguel returns today for follow-up visit.  He had a PET scan a few weeks ago.  He is here to review the results.  Clinically he is doing well.  No pain.  No abdominal discomfort.  No acute complaints.    Review of Systems:    As above in the history.     Review of Systems otherwise Negative for:  General: chills, fever or night sweats  Psychological: anxiety or depression  Ophthalmic: blurry vision, double vision or loss of vision, vision change  ENT: epistaxis, oral lesions, hearing changes  Hematological and Lymphatic: bleeding, bruising, jaundice, swollen lymph nodes  Endocrine: hot flashes, unexpected weight changes  Respiratory: cough, hemoptysis, orthopnea or  shortness of breath/LOPEZ  Cardiovascular: chest pain, edema, palpitations or PND  Gastrointestinal: abdominal pain, blood in stools, change in bowel habits, constipation, diarrhea or nausea/vomiting  Genito-Urinary: change in urinary stream, incontinence, frequency/urgency  Musculoskeletal: joint pain, stiffness, swelling, muscle pain  Neurological: dizziness, headaches, numbness/tingling  Dermatological: lumps and rash    Physical Exam:    /79 (BP Location: Left arm, Patient Position: Sitting, Cuff Size: Adult Regular)   Pulse 68   Temp 98.7  F (37.1  C) (Oral)   Resp 16   Wt 62.2 kg (137 lb 1.6 oz)   SpO2 97%   BMI 22.81 kg/m      General: patient appears stated age of 44 year old. Nontoxic and in no distress.   HEENT: Head: atraumatic, normocephalic. Sclerae anicteric.  Chest:  Normal respiratory effort  Cardiac:  No edema.   Abdomen: abdomen is soft, non-distended  Extremities: normal tone and muscle bulk.  Skin: no lesions or rash on visible skin. Warm and dry.   CNS: alert and oriented. Grossly non-focal.   Psychiatric: normal mood and affect.     Lab Results:    No results found for this or any previous visit (from the past week).    Imaging:    CT Abdomen Pelvis w Contrast  Result Date: 6/25/2025  EXAM: PET ONCOLOGY WHOLE BODY, CT ABDOMEN AND PELVIS WITH IV CONTRAST LOCATION: [facility] DATE: 6/19/2025  INDICATION: Abnormal findings on diagnostic imaging of other abdominal regions, including retroperitoneum. Initial treatment strategy. COMPARISON: CT 6/19/2025 CONTRAST: 67 mL Isovue-370 TECHNIQUE: Serum glucose level 85 mg/dL. One hour post intravenous administration of 10.18mCi F18 FDG, PET imaging was performed from the skull vertex to feet, utilizing attenuation correction with concurrent axial CT and PET/CT image fusion. Separate diagnostic CT of the abdomen was performed. Dose reduction techniques were used. PET/CT FINDINGS: FDG avid bilateral periclavicular,  mediastinal, retrocrural, upper abdominal, retroperitoneal, and left iliac chain lymphadenopathy, the largest and most FDG avid a left periaortic lymph node measuring 4.0 x 1.8 cm (SUV max 12.2), and a left external iliac lymph node measuring 3.6 x 2.0 cm (SUV max 12.3). The spleen is enlarged (15 cm) and demonstrates heterogeneous FDG uptake (SUV max 2.9) which is greater than background liver. Small left pleural effusion with FDG uptake (SUV max 1.9). Normal bone marrow FDG uptake.  CT FINDINGS: No acute intracranial findings. Mucosal thickening bilateral inferior maxillary sinuses. No appreciable coronary artery calcification. Residual consolidation/atelectasis in the left lower lobe. Several subcentimeter pulmonary nodules which are below PET resolution, for example in the right minor fissure measuring 4 mm (series 8, image 52). The lower extremities are unremarkable. T12 inferior endplate Schmorl's node. The lower extremities are unremarkable      IMPRESSION: 1. Findings suspicious for lymphoma involving lymph nodes above and below the diaphragm and the spleen. 2. Small FDG avid left pleural effusion could be reactive from the adjacent possible pneumonia in the proper clinical setting, although involvement by lymphoma could appear similar.    PET Oncology Whole Body  Result Date: 6/20/2025  EXAM: PET ONCOLOGY WHOLE BODY LOCATION: [facility] DATE: 6/19/2025 INDICATION: Abnormal findings on diagnostic imaging of other abdominal regions, including retroperitoneum. Initial treatment strategy. COMPARISON: CT 6/19/2025 CONTRAST: 67 mL Isovue-370 TECHNIQUE: Serum glucose level 85 mg/dL. One hour post intravenous administration of 10.18mCi F18 FDG, PET imaging was performed from the skull vertex to feet, utilizing attenuation correction with concurrent axial CT and PET/CT image fusion. Separate diagnostic CT of the abdomen was performed. Dose reduction techniques were used. PET/CT FINDINGS: FDG  avid bilateral periclavicular, mediastinal, retrocrural, upper abdominal, retroperitoneal, and left iliac chain lymphadenopathy, the largest and most FDG avid a left periaortic lymph node measuring 4.0 x 1.8 cm (SUV max 12.2), and a left external iliac lymph node measuring 3.6 x 2.0 cm (SUV max 12.3). The spleen is enlarged (15 cm) and demonstrates heterogeneous FDG uptake (SUV max 2.9) which is greater than background liver. Small left pleural effusion with FDG uptake (SUV max 1.9). Normal bone marrow FDG uptake. CT FINDINGS: No acute intracranial findings. Mucosal thickening bilateral inferior maxillary sinuses. No appreciable coronary artery calcification. Residual consolidation/atelectasis in the left lower lobe. Several subcentimeter pulmonary nodules which are below PET resolution, for example in the right minor fissure measuring 4 mm (series 8, image 52). The lower extremities are unremarkable. T12 inferior endplate Schmorl's node. The lower extremities are unremarkable     IMPRESSION: 1.  Findings suspicious for lymphoma involving lymph nodes above and below the diaphragm and the spleen. 2.  Small FDG avid left pleural effusion could be reactive from the adjacent possible pneumonia in the proper clinical setting, although involvement by lymphoma could appear similar.      Signed by: Abdelrahman Faust MD

## 2025-07-08 NOTE — LETTER
7/8/2025      Miguel Quiñonez  [address] [address]      Dear Colleague,    Thank you for referring your patient, Miguel Quiñonez, to the [facility] [place]. Please see a copy of my visit note below.    [facility] Hematology and Oncology Progress Note    Patient: Miguel Quiñonez  MRN: [id number]  Date of Service: Jul 8, 2025        Assessment and Plan:    1.  Lymphadenopathy and splenomegaly: He had a PET scan on June 19.  I personally reviewed the images and shared them with Miguel and interpreted them for him.  Findings are most suggestive of a lymphoma.  Step will be to get a core biopsy of one of the lymph nodes.  Referral will be placed to interventional radiology in this regard.  Will see the patient back in clinic 1 week after the biopsy to review the results and make a plan going forward.  Would expect this is something like CLL/SLL or follicular lymphoma.  This was reviewed with the patient.  The rationale for biopsy was explained.  He agrees to proceed.  Will see him back in clinic about a week after his biopsy to review results and make a plan going forward.  Questions were answered.    Medical decision Making:  I spent 36 minutes in the care of this patient today, which included time necessary for preparation for the visit, face to face time with the patient, communication of recommendations to the care team, and documentation time.    ECOG Performance  1    Diagnosis:    1.  Lymphadenopathy    Treatment:    None to date    Interim History:    Miguel returns today for follow-up visit.  He had a PET scan a few weeks ago.  He is here to review the results.  Clinically he is doing well.  No pain.  No abdominal discomfort.  No acute complaints.    Review of Systems:    As above in the history.     Review of Systems otherwise Negative for:  General: chills, fever or night sweats  Psychological: anxiety or depression  Ophthalmic: blurry vision, double vision or loss of  vision, vision change  ENT: epistaxis, oral lesions, hearing changes  Hematological and Lymphatic: bleeding, bruising, jaundice, swollen lymph nodes  Endocrine: hot flashes, unexpected weight changes  Respiratory: cough, hemoptysis, orthopnea or shortness of breath/LOPEZ  Cardiovascular: chest pain, edema, palpitations or PND  Gastrointestinal: abdominal pain, blood in stools, change in bowel habits, constipation, diarrhea or nausea/vomiting  Genito-Urinary: change in urinary stream, incontinence, frequency/urgency  Musculoskeletal: joint pain, stiffness, swelling, muscle pain  Neurological: dizziness, headaches, numbness/tingling  Dermatological: lumps and rash    Physical Exam:    /79 (BP Location: Left arm, Patient Position: Sitting, Cuff Size: Adult Regular)   Pulse 68   Temp 98.7  F (37.1  C) (Oral)   Resp 16   Wt 62.2 kg (137 lb 1.6 oz)   SpO2 97%   BMI 22.81 kg/m      General: patient appears stated age of 44 year old. Nontoxic and in no distress.   HEENT: Head: atraumatic, normocephalic. Sclerae anicteric.  Chest:  Normal respiratory effort  Cardiac:  No edema.   Abdomen: abdomen is soft, non-distended  Extremities: normal tone and muscle bulk.  Skin: no lesions or rash on visible skin. Warm and dry.   CNS: alert and oriented. Grossly non-focal.   Psychiatric: normal mood and affect.     Lab Results:    No results found for this or any previous visit (from the past week).    Imaging:    CT Abdomen Pelvis w Contrast  Result Date: 6/25/2025  EXAM: PET ONCOLOGY WHOLE BODY, CT ABDOMEN AND PELVIS WITH IV CONTRAST LOCATION: [facility] DATE: 6/19/2025  INDICATION: Abnormal findings on diagnostic imaging of other abdominal regions, including retroperitoneum. Initial treatment strategy. COMPARISON: CT 6/19/2025 CONTRAST: 67 mL Isovue-370 TECHNIQUE: Serum glucose level 85 mg/dL. One hour post intravenous administration of 10.18mCi F18 FDG, PET imaging was performed from the skull  vertex to feet, utilizing attenuation correction with concurrent axial CT and PET/CT image fusion. Separate diagnostic CT of the abdomen was performed. Dose reduction techniques were used. PET/CT FINDINGS: FDG avid bilateral periclavicular, mediastinal, retrocrural, upper abdominal, retroperitoneal, and left iliac chain lymphadenopathy, the largest and most FDG avid a left periaortic lymph node measuring 4.0 x 1.8 cm (SUV max 12.2), and a left external iliac lymph node measuring 3.6 x 2.0 cm (SUV max 12.3). The spleen is enlarged (15 cm) and demonstrates heterogeneous FDG uptake (SUV max 2.9) which is greater than background liver. Small left pleural effusion with FDG uptake (SUV max 1.9). Normal bone marrow FDG uptake.  CT FINDINGS: No acute intracranial findings. Mucosal thickening bilateral inferior maxillary sinuses. No appreciable coronary artery calcification. Residual consolidation/atelectasis in the left lower lobe. Several subcentimeter pulmonary nodules which are below PET resolution, for example in the right minor fissure measuring 4 mm (series 8, image 52). The lower extremities are unremarkable. T12 inferior endplate Schmorl's node. The lower extremities are unremarkable      IMPRESSION: 1. Findings suspicious for lymphoma involving lymph nodes above and below the diaphragm and the spleen. 2. Small FDG avid left pleural effusion could be reactive from the adjacent possible pneumonia in the proper clinical setting, although involvement by lymphoma could appear similar.    PET Oncology Whole Body  Result Date: 6/20/2025  EXAM: PET ONCOLOGY WHOLE BODY LOCATION: [facility] DATE: 6/19/2025 INDICATION: Abnormal findings on diagnostic imaging of other abdominal regions, including retroperitoneum. Initial treatment strategy. COMPARISON: CT 6/19/2025 CONTRAST: 67 mL Isovue-370 TECHNIQUE: Serum glucose level 85 mg/dL. One hour post intravenous administration of 10.18mCi F18 FDG, PET  imaging was performed from the skull vertex to feet, utilizing attenuation correction with concurrent axial CT and PET/CT image fusion. Separate diagnostic CT of the abdomen was performed. Dose reduction techniques were used. PET/CT FINDINGS: FDG avid bilateral periclavicular, mediastinal, retrocrural, upper abdominal, retroperitoneal, and left iliac chain lymphadenopathy, the largest and most FDG avid a left periaortic lymph node measuring 4.0 x 1.8 cm (SUV max 12.2), and a left external iliac lymph node measuring 3.6 x 2.0 cm (SUV max 12.3). The spleen is enlarged (15 cm) and demonstrates heterogeneous FDG uptake (SUV max 2.9) which is greater than background liver. Small left pleural effusion with FDG uptake (SUV max 1.9). Normal bone marrow FDG uptake. CT FINDINGS: No acute intracranial findings. Mucosal thickening bilateral inferior maxillary sinuses. No appreciable coronary artery calcification. Residual consolidation/atelectasis in the left lower lobe. Several subcentimeter pulmonary nodules which are below PET resolution, for example in the right minor fissure measuring 4 mm (series 8, image 52). The lower extremities are unremarkable. T12 inferior endplate Schmorl's node. The lower extremities are unremarkable     IMPRESSION: 1.  Findings suspicious for lymphoma involving lymph nodes above and below the diaphragm and the spleen. 2.  Small FDG avid left pleural effusion could be reactive from the adjacent possible pneumonia in the proper clinical setting, although involvement by lymphoma could appear similar.      Signed by: Abdelrahman Faust MD      Again, thank you for allowing me to participate in the care of your patient.        Sincerely,        Abdelrahman Faust MD    Electronically signed

## 2025-07-08 NOTE — PROGRESS NOTES
[facility] Hematology and Oncology Progress Note    Patient: Miguel Quiñonez  MRN: [id number]  Date of Service: Jul 8, 2025        Assessment and Plan:    1.  Lymphadenopathy and splenomegaly: He had a PET scan on June 19.  I personally reviewed the images and shared them with Miguel and interpreted them for him.  Findings are most suggestive of a lymphoma.  Step will be to get a core biopsy of one of the lymph nodes.  Referral will be placed to interventional radiology in this regard.  Will see the patient back in clinic 1 week after the biopsy to review the results and make a plan going forward.  Would expect this is something like CLL/SLL or follicular lymphoma.  This was reviewed with the patient.  The rationale for biopsy was explained.  He agrees to proceed.  Will see him back in clinic about a week after his biopsy to review results and make a plan going forward.  Questions were answered.    Medical decision Making:  I spent 36 minutes in the care of this patient today, which included time necessary for preparation for the visit, face to face time with the patient, communication of recommendations to the care team, and documentation time.    ECOG Performance  1    Diagnosis:    1.  Lymphadenopathy    Treatment:    None to date    Interim History:    Miguel returns today for follow-up visit.  He had a PET scan a few weeks ago.  He is here to review the results.  Clinically he is doing well.  No pain.  No abdominal discomfort.  No acute complaints.    Review of Systems:    As above in the history.     Review of Systems otherwise Negative for:  General: chills, fever or night sweats  Psychological: anxiety or depression  Ophthalmic: blurry vision, double vision or loss of vision, vision change  ENT: epistaxis, oral lesions, hearing changes  Hematological and Lymphatic: bleeding, bruising, jaundice, swollen lymph nodes  Endocrine: hot flashes, unexpected weight changes  Respiratory: cough, hemoptysis, orthopnea or  shortness of breath/LOPEZ  Cardiovascular: chest pain, edema, palpitations or PND  Gastrointestinal: abdominal pain, blood in stools, change in bowel habits, constipation, diarrhea or nausea/vomiting  Genito-Urinary: change in urinary stream, incontinence, frequency/urgency  Musculoskeletal: joint pain, stiffness, swelling, muscle pain  Neurological: dizziness, headaches, numbness/tingling  Dermatological: lumps and rash    Physical Exam:    /79 (BP Location: Left arm, Patient Position: Sitting, Cuff Size: Adult Regular)   Pulse 68   Temp 98.7  F (37.1  C) (Oral)   Resp 16   Wt 62.2 kg (137 lb 1.6 oz)   SpO2 97%   BMI 22.81 kg/m      General: patient appears stated age of 44 year old. Nontoxic and in no distress.   HEENT: Head: atraumatic, normocephalic. Sclerae anicteric.  Chest:  Normal respiratory effort  Cardiac:  No edema.   Abdomen: abdomen is soft, non-distended  Extremities: normal tone and muscle bulk.  Skin: no lesions or rash on visible skin. Warm and dry.   CNS: alert and oriented. Grossly non-focal.   Psychiatric: normal mood and affect.     Lab Results:    No results found for this or any previous visit (from the past week).    Imaging:    CT Abdomen Pelvis w Contrast  Result Date: 6/25/2025  EXAM: PET ONCOLOGY WHOLE BODY, CT ABDOMEN AND PELVIS WITH IV CONTRAST LOCATION: [facility] DATE: 6/19/2025  INDICATION: Abnormal findings on diagnostic imaging of other abdominal regions, including retroperitoneum. Initial treatment strategy. COMPARISON: CT 6/19/2025 CONTRAST: 67 mL Isovue-370 TECHNIQUE: Serum glucose level 85 mg/dL. One hour post intravenous administration of 10.18mCi F18 FDG, PET imaging was performed from the skull vertex to feet, utilizing attenuation correction with concurrent axial CT and PET/CT image fusion. Separate diagnostic CT of the abdomen was performed. Dose reduction techniques were used. PET/CT FINDINGS: FDG avid bilateral periclavicular,  mediastinal, retrocrural, upper abdominal, retroperitoneal, and left iliac chain lymphadenopathy, the largest and most FDG avid a left periaortic lymph node measuring 4.0 x 1.8 cm (SUV max 12.2), and a left external iliac lymph node measuring 3.6 x 2.0 cm (SUV max 12.3). The spleen is enlarged (15 cm) and demonstrates heterogeneous FDG uptake (SUV max 2.9) which is greater than background liver. Small left pleural effusion with FDG uptake (SUV max 1.9). Normal bone marrow FDG uptake.  CT FINDINGS: No acute intracranial findings. Mucosal thickening bilateral inferior maxillary sinuses. No appreciable coronary artery calcification. Residual consolidation/atelectasis in the left lower lobe. Several subcentimeter pulmonary nodules which are below PET resolution, for example in the right minor fissure measuring 4 mm (series 8, image 52). The lower extremities are unremarkable. T12 inferior endplate Schmorl's node. The lower extremities are unremarkable      IMPRESSION: 1. Findings suspicious for lymphoma involving lymph nodes above and below the diaphragm and the spleen. 2. Small FDG avid left pleural effusion could be reactive from the adjacent possible pneumonia in the proper clinical setting, although involvement by lymphoma could appear similar.    PET Oncology Whole Body  Result Date: 6/20/2025  EXAM: PET ONCOLOGY WHOLE BODY LOCATION: [facility] DATE: 6/19/2025 INDICATION: Abnormal findings on diagnostic imaging of other abdominal regions, including retroperitoneum. Initial treatment strategy. COMPARISON: CT 6/19/2025 CONTRAST: 67 mL Isovue-370 TECHNIQUE: Serum glucose level 85 mg/dL. One hour post intravenous administration of 10.18mCi F18 FDG, PET imaging was performed from the skull vertex to feet, utilizing attenuation correction with concurrent axial CT and PET/CT image fusion. Separate diagnostic CT of the abdomen was performed. Dose reduction techniques were used. PET/CT FINDINGS: FDG  avid bilateral periclavicular, mediastinal, retrocrural, upper abdominal, retroperitoneal, and left iliac chain lymphadenopathy, the largest and most FDG avid a left periaortic lymph node measuring 4.0 x 1.8 cm (SUV max 12.2), and a left external iliac lymph node measuring 3.6 x 2.0 cm (SUV max 12.3). The spleen is enlarged (15 cm) and demonstrates heterogeneous FDG uptake (SUV max 2.9) which is greater than background liver. Small left pleural effusion with FDG uptake (SUV max 1.9). Normal bone marrow FDG uptake. CT FINDINGS: No acute intracranial findings. Mucosal thickening bilateral inferior maxillary sinuses. No appreciable coronary artery calcification. Residual consolidation/atelectasis in the left lower lobe. Several subcentimeter pulmonary nodules which are below PET resolution, for example in the right minor fissure measuring 4 mm (series 8, image 52). The lower extremities are unremarkable. T12 inferior endplate Schmorl's node. The lower extremities are unremarkable     IMPRESSION: 1.  Findings suspicious for lymphoma involving lymph nodes above and below the diaphragm and the spleen. 2.  Small FDG avid left pleural effusion could be reactive from the adjacent possible pneumonia in the proper clinical setting, although involvement by lymphoma could appear similar.      Signed by: Abdelrahman Faust MD

## 2025-07-14 ENCOUNTER — MYC MEDICAL ADVICE (OUTPATIENT)
Dept: INTERVENTIONAL RADIOLOGY/VASCULAR | Facility: CLINIC | Age: 45
End: 2025-07-14
Payer: COMMERCIAL

## 2025-07-22 ENCOUNTER — HOSPITAL ENCOUNTER (OUTPATIENT)
Dept: CT IMAGING | Facility: HOSPITAL | Age: 45
Discharge: HOME | End: 2025-07-22
Attending: INTERNAL MEDICINE
Payer: COMMERCIAL

## 2025-07-22 VITALS
HEART RATE: 73 BPM | TEMPERATURE: 97.5 F | SYSTOLIC BLOOD PRESSURE: 105 MMHG | DIASTOLIC BLOOD PRESSURE: 72 MMHG | RESPIRATION RATE: 15 BRPM | OXYGEN SATURATION: 99 %

## 2025-07-22 DIAGNOSIS — R59.1 LYMPHADENOPATHY: ICD-10-CM

## 2025-07-22 LAB
HGB BLD-MCNC: 15.7 G/DL (ref 13.3–17.7)
INR PPP: 0.98 (ref 0.85–1.15)
MCV RBC AUTO: 76 FL (ref 78–100)
MCV RBC AUTO: 76 FL (ref 78–100)
PLATELET # BLD AUTO: 157 10E3/UL (ref 150–450)
PROTHROMBIN TIME: 13.3 SECONDS (ref 11.8–14.8)

## 2025-07-22 PROCEDURE — 36415 COLL VENOUS BLD VENIPUNCTURE: CPT | Performed by: RADIOLOGY

## 2025-07-22 PROCEDURE — 85610 PROTHROMBIN TIME: CPT | Performed by: RADIOLOGY

## 2025-07-22 PROCEDURE — 85018 HEMOGLOBIN: CPT | Performed by: RADIOLOGY

## 2025-07-22 PROCEDURE — 272N000710 CT ABDOMEN RETROPERITONEAL BIOPSY

## 2025-07-22 PROCEDURE — 88184 FLOWCYTOMETRY/ TC 1 MARKER: CPT | Performed by: STUDENT IN AN ORGANIZED HEALTH CARE EDUCATION/TRAINING PROGRAM

## 2025-07-22 PROCEDURE — 85049 AUTOMATED PLATELET COUNT: CPT | Performed by: RADIOLOGY

## 2025-07-22 PROCEDURE — 250N000011 HC RX IP 250 OP 636: Performed by: RADIOLOGY

## 2025-07-22 PROCEDURE — 88185 FLOWCYTOMETRY/TC ADD-ON: CPT | Performed by: INTERNAL MEDICINE

## 2025-07-22 RX ORDER — NALOXONE HYDROCHLORIDE 0.4 MG/ML
0.4 INJECTION, SOLUTION INTRAMUSCULAR; INTRAVENOUS; SUBCUTANEOUS
Status: DISCONTINUED | OUTPATIENT
Start: 2025-07-22 | End: 2025-07-23 | Stop reason: HOSPADM

## 2025-07-22 RX ORDER — FENTANYL CITRATE 50 UG/ML
25-50 INJECTION, SOLUTION INTRAMUSCULAR; INTRAVENOUS EVERY 5 MIN PRN
Refills: 0 | Status: DISCONTINUED | OUTPATIENT
Start: 2025-07-22 | End: 2025-07-23 | Stop reason: HOSPADM

## 2025-07-22 RX ORDER — FLUMAZENIL 0.1 MG/ML
0.2 INJECTION, SOLUTION INTRAVENOUS
Status: DISCONTINUED | OUTPATIENT
Start: 2025-07-22 | End: 2025-07-23 | Stop reason: HOSPADM

## 2025-07-22 RX ORDER — NALOXONE HYDROCHLORIDE 0.4 MG/ML
0.2 INJECTION, SOLUTION INTRAMUSCULAR; INTRAVENOUS; SUBCUTANEOUS
Status: DISCONTINUED | OUTPATIENT
Start: 2025-07-22 | End: 2025-07-23 | Stop reason: HOSPADM

## 2025-07-22 RX ORDER — ONDANSETRON 2 MG/ML
4 INJECTION INTRAMUSCULAR; INTRAVENOUS
Status: DISCONTINUED | OUTPATIENT
Start: 2025-07-22 | End: 2025-07-23 | Stop reason: HOSPADM

## 2025-07-22 RX ADMIN — FENTANYL CITRATE 50 MCG: 50 INJECTION, SOLUTION INTRAMUSCULAR; INTRAVENOUS at 08:24

## 2025-07-22 RX ADMIN — FENTANYL CITRATE 50 MCG: 50 INJECTION, SOLUTION INTRAMUSCULAR; INTRAVENOUS at 08:39

## 2025-07-22 RX ADMIN — MIDAZOLAM HYDROCHLORIDE 1 MG: 1 INJECTION, SOLUTION INTRAMUSCULAR; INTRAVENOUS at 08:50

## 2025-07-22 RX ADMIN — MIDAZOLAM HYDROCHLORIDE 1 MG: 1 INJECTION, SOLUTION INTRAMUSCULAR; INTRAVENOUS at 08:22

## 2025-07-22 RX ADMIN — MIDAZOLAM HYDROCHLORIDE 1 MG: 1 INJECTION, SOLUTION INTRAMUSCULAR; INTRAVENOUS at 08:37

## 2025-07-22 NOTE — PRE-PROCEDURE
GENERAL PRE-PROCEDURE:   Procedure:  CT guided percutaneous biopsy left external iliac lymphadenopathy and moderate conscious sedation    Verbal consent obtained?: Yes    Written consent obtained?: Yes    Risks and benefits: Risks, benefits and alternatives were discussed    DC Plan: Appropriate discharge home plan in place for patients who are going home after procedure   Consent given by:  Patient  Patient states understanding of procedure being performed: Yes    Patient's understanding of procedure matches consent: Yes    Procedure consent matches procedure scheduled: Yes    Expected level of sedation:  Moderate  Appropriately NPO:  Yes  ASA Class:  1  Mallampati  :  Grade 2- soft palate, base of uvula, tonsillar pillars, and portion of posterior pharyngeal wall visible  Lungs:  Lungs clear with good breath sounds bilaterally  Heart:  Normal heart sounds and rate  History & Physical reviewed:  History and physical reviewed and no updates needed  Statement of review:  I have reviewed the lab findings, diagnostic data, medications, and the plan for sedation

## 2025-07-22 NOTE — PROCEDURES
[facility]    Procedure: CT guided percutaneous biopsy left external iliac lymphadenopathy and moderate conscious sedation    Date/Time: 7/22/2025 9:00 AM    Performed by: Jerry Mcdowell MD  Authorized by: Jerry Mcdowell MD  IR Fellow Physician:    Pre Procedure Diagnosis: left external iliac lymphadenopathy  Post Procedure Diagnosis: left external iliac lymphadenopathy    UNIVERSAL PROTOCOL   Site Marked: Yes  Prior Images Obtained and Reviewed:  Yes  Required items: Required blood products, implants, devices and special equipment available    Patient identity confirmed:  Verbally with patient, arm band, provided demographic data and hospital-assigned identification number  Patient was reevaluated immediately before administering moderate or deep sedation or anesthesia  Confirmation Checklist:  Correct equipment/implants were available, procedure was appropriate and matched the consent or emergent situation, relevant allergies and patient's identity using two indicators  Time out: Immediately prior to the procedure a time out was called    Universal Protocol: the Joint Commission Universal Protocol was followed    Preparation: Patient was prepped and draped in usual sterile fashion    ESBL (mL):  2     ANESTHESIA    Local Anesthetic:  Lidocaine 1% without epinephrine  Anesthetic Total (mL):  10      SEDATION  Patient Sedated: Yes    Sedation Type:  Moderate (conscious) sedation  Sedation:  Fentanyl, midazolam and see MAR for details  Vital signs: Vital signs monitored during sedation    See dictated procedure note for full details.  Findings: CT guided percutaneous biopsy left external iliac lymphadenopathy and moderate conscious sedation well tolerated. Five 18 gauge core biopsies for histo and flow cytometry judged adequate.    Specimens: core needle biopsy specimens sent for pathological analysis    Procedural Complications: None    Condition: Stable    Plan: Discharge to home in one hour  after he meets discharge criteria      PROCEDURE  Describe Procedure: CT guided percutaneous biopsy left external iliac lymphadenopathy and moderate conscious sedation  Patient Tolerance:  Patient tolerated the procedure well with no immediate complications  Length of time physician/provider present for 1:1 monitoring during sedation:  23-37 min

## 2025-07-22 NOTE — IR NOTE
Patient Name: Miguel Quiñonez  Medical Record Number: [id number]  Today's Date: 7/22/2025    Procedure: Retroperitoneal bx  Proceduralist:  Dr ramos    Procedure Start: 0835  Procedure end: 090  Sedation medications administered: 3 mg midazolam and 100 mcg fentanyl   Sedation time: 25 minutes    
DISPLAY PLAN FREE TEXT

## 2025-07-22 NOTE — DISCHARGE INSTRUCTIONS
1. You are required to have someone accompany you home. Do not drive or operate machinery today as the medication may cause sleepiness.    2. Rest today and avoid strenuous activity or heavy lifting for 48 hours. Over-activity may produce dizziness and or nausea.    3. You should follow your normal diet. Drink plenty of fluids. No alcoholic beverages for 24 hours. *(Alcohol may interact with the medications you received today)    4. Leave bandage on today, you may remove tomorrow.    5. You may shower tomorrow. Do not soak in a bath tub, hot tub, or swim until the site is completely healed and the skin glue is off. Keep the site clean and dry.    ADDITIONAL INSTRUCTIONS    When to call your Doctor:     1. Watch your biopsy site for signs of infection, increase pain, redness, swelling, or any drainage and or fever or chills.    2. On-going nausea, vomiting, or un-usual increase in pain.    3. If you experience any of the above or sudden weakness, dizziness, abdominal pain, flank pain or a temperature above 100.0 degree F for more than 24 hours, call your Doctor.    4. Sudden on-set of shortness of breath - call 911 or go to the emergency room.             * Recovery After Conscious Sedation (Adult)  We gave you medicine by vein to make you sleepy or relaxed during your procedure. This may have included both a pain medicine and sleeping medicine. Most of the effects have worn off. But you may still feel sleepy for the next 6 to 8 hours.  Home care  Follow these guidelines when you get home:  You may feel sleepy and clumsy and have poor balance for the next few hours.  A responsible adult should stay with you for the next 8 hours. This person should make sure your condition doesn t get worse.  Don't drink any alcohol for the next 24 hours.  Don't drive, operate dangerous machinery, make important business or personal decisions or sign legal documents during the next 24 hours.  You may vomit (throw up) if you eat too  soon after the procedure. If this happens, drink small amounts of water, juice or clear broth. Wait to try solid food until you no longer have nausea (upset stomach).  Note: Your care team may tell you not to take any medicine by mouth for pain or sleep in the next 4 hours. These medicines may react with the medicines you had in the hospital. This could cause a much stronger response than usual.  Follow-up care  Follow up with your care team if you are not alert and back to your usual level of activity within 12 hours.  When to seek medical advice  Call your care team right away if any of these occur:  You still feel sleepy or clumsy after 12 hours, or your sleepiness gets worse  Weakness or dizziness gets worse  Repeated vomiting  If you can't be woken up and someone is staying with you, they should call 911.  For informational purposes only. Not to replace the advice of your health care provider.  Copyright   2018 [employer]. All rights reserved.

## 2025-07-24 LAB
PATH REPORT.COMMENTS IMP SPEC: NORMAL
PATH REPORT.FINAL DX SPEC: NORMAL
PATH REPORT.MICROSCOPIC SPEC OTHER STN: NORMAL
PATH REPORT.RELEVANT HX SPEC: NORMAL

## 2025-07-31 ENCOUNTER — APPOINTMENT (OUTPATIENT)
Dept: CT IMAGING | Facility: HOSPITAL | Age: 45
End: 2025-07-31
Attending: STUDENT IN AN ORGANIZED HEALTH CARE EDUCATION/TRAINING PROGRAM
Payer: COMMERCIAL

## 2025-07-31 ENCOUNTER — HOSPITAL ENCOUNTER (EMERGENCY)
Facility: HOSPITAL | Age: 45
Discharge: HOME | End: 2025-07-31
Attending: STUDENT IN AN ORGANIZED HEALTH CARE EDUCATION/TRAINING PROGRAM
Payer: COMMERCIAL

## 2025-07-31 VITALS
HEART RATE: 71 BPM | TEMPERATURE: 97 F | BODY MASS INDEX: 22.76 KG/M2 | DIASTOLIC BLOOD PRESSURE: 94 MMHG | SYSTOLIC BLOOD PRESSURE: 153 MMHG | RESPIRATION RATE: 20 BRPM | OXYGEN SATURATION: 98 % | WEIGHT: 136.8 LBS

## 2025-07-31 DIAGNOSIS — M79.622 PAIN OF LEFT UPPER ARM: Primary | ICD-10-CM

## 2025-07-31 DIAGNOSIS — M54.12 CERVICAL RADICULOPATHY: ICD-10-CM

## 2025-07-31 LAB
ANION GAP SERPL CALCULATED.3IONS-SCNC: 12 MMOL/L (ref 7–15)
BUN SERPL-MCNC: 6.4 MG/DL (ref 6–20)
CALCIUM SERPL-MCNC: 9.4 MG/DL (ref 8.8–10.4)
CHLORIDE SERPL-SCNC: 100 MMOL/L (ref 98–107)
CREAT SERPL-MCNC: 0.85 MG/DL (ref 0.67–1.17)
EGFRCR SERPLBLD CKD-EPI 2021: >90 ML/MIN/1.73M2
ERYTHROCYTE [DISTWIDTH] IN BLOOD BY AUTOMATED COUNT: 15.3 % (ref 10–15)
GLUCOSE SERPL-MCNC: 119 MG/DL (ref 70–99)
HCO3 SERPL-SCNC: 27 MMOL/L (ref 22–29)
HCT VFR BLD AUTO: 44.5 % (ref 40–53)
HGB BLD-MCNC: 14.9 G/DL (ref 13.3–17.7)
MCH RBC QN AUTO: 25.5 PG (ref 26.5–33)
MCHC RBC AUTO-ENTMCNC: 33.5 G/DL (ref 31.5–36.5)
MCV RBC AUTO: 76 FL (ref 78–100)
PLATELET # BLD AUTO: 193 10E3/UL (ref 150–450)
POTASSIUM SERPL-SCNC: 3.5 MMOL/L (ref 3.4–5.3)
RBC # BLD AUTO: 5.84 10E6/UL (ref 4.4–5.9)
SODIUM SERPL-SCNC: 139 MMOL/L (ref 135–145)
TROPONIN T SERPL HS-MCNC: 7 NG/L
WBC # BLD AUTO: 4.4 10E3/UL (ref 4–11)

## 2025-07-31 PROCEDURE — 250N000013 HC RX MED GY IP 250 OP 250 PS 637: Performed by: STUDENT IN AN ORGANIZED HEALTH CARE EDUCATION/TRAINING PROGRAM

## 2025-07-31 PROCEDURE — 85027 COMPLETE CBC AUTOMATED: CPT | Performed by: STUDENT IN AN ORGANIZED HEALTH CARE EDUCATION/TRAINING PROGRAM

## 2025-07-31 PROCEDURE — 36415 COLL VENOUS BLD VENIPUNCTURE: CPT | Performed by: STUDENT IN AN ORGANIZED HEALTH CARE EDUCATION/TRAINING PROGRAM

## 2025-07-31 PROCEDURE — 93005 ELECTROCARDIOGRAM TRACING: CPT | Performed by: STUDENT IN AN ORGANIZED HEALTH CARE EDUCATION/TRAINING PROGRAM

## 2025-07-31 PROCEDURE — 74174 CTA ABD&PLVS W/CONTRAST: CPT

## 2025-07-31 PROCEDURE — 96375 TX/PRO/DX INJ NEW DRUG ADDON: CPT | Mod: 59

## 2025-07-31 PROCEDURE — 84484 ASSAY OF TROPONIN QUANT: CPT | Performed by: STUDENT IN AN ORGANIZED HEALTH CARE EDUCATION/TRAINING PROGRAM

## 2025-07-31 PROCEDURE — 250N000011 HC RX IP 250 OP 636: Performed by: STUDENT IN AN ORGANIZED HEALTH CARE EDUCATION/TRAINING PROGRAM

## 2025-07-31 PROCEDURE — 80048 BASIC METABOLIC PNL TOTAL CA: CPT | Performed by: STUDENT IN AN ORGANIZED HEALTH CARE EDUCATION/TRAINING PROGRAM

## 2025-07-31 PROCEDURE — 99285 EMERGENCY DEPT VISIT HI MDM: CPT | Mod: 25 | Performed by: STUDENT IN AN ORGANIZED HEALTH CARE EDUCATION/TRAINING PROGRAM

## 2025-07-31 PROCEDURE — 72125 CT NECK SPINE W/O DYE: CPT

## 2025-07-31 PROCEDURE — 96374 THER/PROPH/DIAG INJ IV PUSH: CPT | Mod: 59

## 2025-07-31 PROCEDURE — 250N000012 HC RX MED GY IP 250 OP 636 PS 637: Performed by: STUDENT IN AN ORGANIZED HEALTH CARE EDUCATION/TRAINING PROGRAM

## 2025-07-31 PROCEDURE — 99285 EMERGENCY DEPT VISIT HI MDM: CPT | Mod: 25

## 2025-07-31 PROCEDURE — 82435 ASSAY OF BLOOD CHLORIDE: CPT | Performed by: STUDENT IN AN ORGANIZED HEALTH CARE EDUCATION/TRAINING PROGRAM

## 2025-07-31 PROCEDURE — 999N000104 CT THORACIC SPINE RECONSTRUCTED

## 2025-07-31 RX ORDER — KETOROLAC TROMETHAMINE 15 MG/ML
15 INJECTION, SOLUTION INTRAMUSCULAR; INTRAVENOUS ONCE
Status: COMPLETED | OUTPATIENT
Start: 2025-07-31 | End: 2025-07-31

## 2025-07-31 RX ORDER — IOPAMIDOL 755 MG/ML
75 INJECTION, SOLUTION INTRAVASCULAR ONCE
Status: COMPLETED | OUTPATIENT
Start: 2025-07-31 | End: 2025-07-31

## 2025-07-31 RX ORDER — PREDNISONE 20 MG/1
50 TABLET ORAL DAILY
Qty: 10 TABLET | Refills: 0 | Status: SHIPPED | OUTPATIENT
Start: 2025-07-31 | End: 2025-08-04

## 2025-07-31 RX ORDER — GABAPENTIN 100 MG/1
300 CAPSULE ORAL ONCE
Status: COMPLETED | OUTPATIENT
Start: 2025-07-31 | End: 2025-07-31

## 2025-07-31 RX ORDER — MORPHINE SULFATE 4 MG/ML
4 INJECTION, SOLUTION INTRAMUSCULAR; INTRAVENOUS ONCE
Refills: 0 | Status: COMPLETED | OUTPATIENT
Start: 2025-07-31 | End: 2025-07-31

## 2025-07-31 RX ORDER — OXYCODONE HYDROCHLORIDE 5 MG/1
5 TABLET ORAL EVERY 8 HOURS PRN
Qty: 8 TABLET | Refills: 0 | Status: SHIPPED | OUTPATIENT
Start: 2025-07-31

## 2025-07-31 RX ADMIN — MORPHINE SULFATE 4 MG: 4 INJECTION, SOLUTION INTRAMUSCULAR; INTRAVENOUS at 07:02

## 2025-07-31 RX ADMIN — GABAPENTIN 300 MG: 100 CAPSULE ORAL at 04:51

## 2025-07-31 RX ADMIN — KETOROLAC TROMETHAMINE 15 MG: 15 INJECTION, SOLUTION INTRAMUSCULAR; INTRAVENOUS at 04:52

## 2025-07-31 RX ADMIN — IOPAMIDOL 75 ML: 755 INJECTION, SOLUTION INTRAVENOUS at 05:55

## 2025-07-31 RX ADMIN — PREDNISONE 50 MG: 20 TABLET ORAL at 06:39

## 2025-07-31 ASSESSMENT — ACTIVITIES OF DAILY LIVING (ADL)
ADLS_ACUITY_SCORE: 41

## 2025-07-31 ASSESSMENT — COLUMBIA-SUICIDE SEVERITY RATING SCALE - C-SSRS
2. HAVE YOU ACTUALLY HAD ANY THOUGHTS OF KILLING YOURSELF IN THE PAST MONTH?: NO
1. IN THE PAST MONTH, HAVE YOU WISHED YOU WERE DEAD OR WISHED YOU COULD GO TO SLEEP AND NOT WAKE UP?: NO
6. HAVE YOU EVER DONE ANYTHING, STARTED TO DO ANYTHING, OR PREPARED TO DO ANYTHING TO END YOUR LIFE?: NO

## 2025-07-31 NOTE — ED PROVIDER NOTES
EMERGENCY DEPARTMENT ENCOUNTER       ED Course & Medical Decision Making     4:33 AM Introduced myself to the patient, obtained history of present illness, and performed initial physical exam at this time.     Final Impression  44 year old male presents for evaluation of upper back pain, states that it starts in his lower neck or between his shoulder blades, radiates to his left arm, has been nearly constant for the last 24 hours.  States he has had a similar pain in the past about 6-8 months ago, but eventually passed, was never seen or diagnosed with anything.  Patient was fairly recently diagnosed with what looks like lymphoma, though is still in the somewhat early stages, just had a biopsy on 7/22/2025 as noted below that shows concern for likely lymphoma..  His description of the pain that starts in shoulder blades and radiates down his left arm, initial concern for possible aortic dissection, though CTA chest negative.  CTs of the cervical and thoracic spine did not show any acute abnormalities, though does show some mild degenerative disc disease and central canal and foraminal narrowing at C6/C7.  Given that the symptoms radiate down his left arm, may have a component of cervical radiculopathy, thus was ultimately given prednisone after symptoms fail to improve with gabapentin and Toradol.  CTA chest and pelvis negative for dissection or other acute pathology.  CT of the thoracic spine shows some likely chronic appearing wedge deformity of T12.  Also shows mild degenerative disc disease and central canal and foraminal narrowing at C6/C7.  Given patient is having symptoms that radiate down his left arm with a negative CTA chest, at this juncture would suspect most likely cervical radiculopathy.  Discussed presumptive diagnosis with patient, as well as initial management with steroids, opiates as needed for breakthrough and follow-up in the spine clinic.  Patient states that he will be able to get a ride home  today, is already calling for a ride.  Will treat with dose of prednisone and morphine and send prescriptions for additional pain medication to his pharmacy electronically, spine clinic referral made.    Prior to making a final disposition on this patient the results of patient's tests and other diagnostic studies were discussed with the patient. All questions were answered. Patient expressed understanding of the plan and was amenable to it.    Medical Decision Making  Supplemental history from: NA  External Record(s) reviewed as documented below;  5/21/25, [facility] note, seen by Dr Faust for lymphadenopathy and splenomegaly  7/8/25, [facility] note, seen for follow up prior testing, workup concerning for lymphoma, recommended getting a biopsy  7/22/25, [facility] note, seen for IR biopy of left external iliac lymph node  Chart documentation includes differential considered  EKGs or imaging independently interpreted my me (see Labs & Imaging and EKG sections).  DDx considered but not limited to: Cervical Radiculopathy, pulmonary embolism, aortic dissection, ACS  Care impacted by chronic illness: Lymphoma  Disposition considerations: Discharge. I prescribed additional prescription strength medication(s) as charted. I considered admission, but discharged the patient after share decision making conversation.    CT Pulmonary Angiogram:CT PA was ordered for reason(s) other than PE.    None      Medications   morphine (PF) injection 4 mg (has no administration in time range)   gabapentin (NEURONTIN) capsule 300 mg (300 mg Oral $Given 7/31/25 0451)   ketorolac (TORADOL) injection 15 mg (15 mg Intravenous $Given 7/31/25 8352)   iopamidol (ISOVUE-370) solution 75 mL (75 mLs Intravenous $Given 7/31/25 5460)   predniSONE (DELTASONE) tablet 50 mg (50 mg Oral $Given 7/31/25 3673)     New Prescriptions    OXYCODONE (ROXICODONE) 5 MG TABLET    Take 1 tablet (5 mg) by mouth every 8 hours as needed for pain.  "   PREDNISONE (DELTASONE) 20 MG TABLET    Take 2.5 tablets (50 mg) by mouth daily for 4 days.     Modified Medications    No medications on file     Final Impression     1. Pain of left upper arm    2. Cervical radiculopathy        Chief Complaint     Chief Complaint   Patient presents with    Back Pain     Pt states upper back pain 8/10.  Pt states pain is stemming from left inner shoulder blade.  Pt states pain radiates down into left arm and hand.     HPI     Miguel Quiñonez is a 44 year old male who presents for evaluation of back pain. The patient has a history of left sided back pain.     The patient states that he has as pinched nerve that has been causing him constant pain for the last 24 hours in the left side of his back radiating into his neck, shoulder, and left arm. He states he \"can't think\", and says it almost feels like his shoulder has . The pain is constant with movement and he describes a throbbing in his arm, saying \"everything is tight.\" The patient denies any rash. He did have pain like this 6-8 months ago and used a theracane which helped then, but did not help this time.     Per chart review:  - 5/3/2025 (2 hours) [facility] Emergency Department: The patient was seen in the ED for evaluation of chest pain, shortness of breath. Ddimer was elevated, lipase 104, LFTs, CBC, BMP were normal, CT showed likely early developing pneumonia of let lower lung, Started on Augmentin.    - 7/8/2025 [facility] [place]: The patient was seen by oncology for evaluation of lymphadenopathy and splenomegaly. Findings from a recent PET scan suggested lymphoma. Planned to get biopsy. CT abdomen pelvis showed findings suspicious for lymphoma involving lymph nodes above and below the diaphragm and the spleen.    I, Rita Santos am serving as a scribe to document services personally performed by Dr. Agus Bolton MD, based on my observation and the provider's " statements to me. I, Dr. Agus Bolton MD attest that Rita Santos is acting in a scribe capacity, has observed my performance of the services and has documented them in accordance with my direction.    Physical Exam     BP (!) 160/96   Pulse 54   Temp 97  F (36.1  C)   Resp 20   Wt 62.1 kg (136 lb 12.8 oz)   SpO2 98%   BMI 22.76 kg/m    Constitutional: Awake, alert, in no acute distress.  Head: Normocephalic, atraumatic.  ENT: Mucous membranes moist.  Eyes: Conjunctiva normal.  Respiratory: Respirations even, unlabored, in no acute respiratory distress.  Lungs clear to auscultation bilaterally.  Cardiovascular: Regular rate and rhythm. Good peripheral perfusion.  Good radial pulses in bilateral wrists.  GI: Abdomen soft, non-tender.  No guarding or rebound.  Musculoskeletal: Moves all 4 extremities equally.  Good  strength in bilateral upper extremities.  No pain with passive range of motion of the left upper extremity or left shoulder.  Integument: Warm, dry.  No rashes on back or between shoulder blades.  Neurologic: Alert & oriented x 3. Normal speech. Grossly normal motor and sensory function. No focal deficits noted.  Psychiatric: Normal mood    Labs & Imaging     Imaging reviewed and independently interpreted as below;   CTA chest images reviewed, no aortic dissection seen.    Results for orders placed or performed during the hospital encounter of 07/31/25   CTA Chest Abdomen Pelvis w Contrast    Impression    IMPRESSION:  1.  No evidence of thoracoabdominal aortic aneurysm or dissection.  2.  Interval decrease in size of the multiple enlarged thoracoabdominal lymph nodes, likely related to patient's known lymphoma.  3.  Interval decrease in size of the splenomegaly.  4.  Trace left pleural effusion, decreased as compared to 6/19/2025 exam.  5.  Multiple small pulmonary nodules including 3 mm right middle lobe nodule, new as compared to 6/19/2025 exam, indeterminate, could be infectious,  recommend attention on follow-up.           CT Cervical Spine w/o Contrast    Impression    IMPRESSION:  1.  No fracture or posttraumatic subluxation.  2.  Mild degenerative disc disease and central canal and foraminal narrowing at C6-C7.     CT Thoracic Spine Reconstructed    Impression    IMPRESSION:  1.  No acute fracture or posttraumatic subluxation.  2.  Stable mild anterior wedging of the T12 vertebral body may be congenital or due to remote compression fracture.  3.  No high-grade spinal canal or neural foraminal stenosis.     CBC with Platelets (Limited Occurrences)   Result Value Ref Range    WBC Count 4.4 4.0 - 11.0 10e3/uL    RBC Count 5.84 4.40 - 5.90 10e6/uL    Hemoglobin 14.9 13.3 - 17.7 g/dL    Hematocrit 44.5 40.0 - 53.0 %    MCV 76 (L) 78 - 100 fL    MCH 25.5 (L) 26.5 - 33.0 pg    MCHC 33.5 31.5 - 36.5 g/dL    RDW 15.3 (H) 10.0 - 15.0 %    Platelet Count 193 150 - 450 10e3/uL   Basic Metabolic Panel (Limited Occurrences)   Result Value Ref Range    Sodium 139 135 - 145 mmol/L    Potassium 3.5 3.4 - 5.3 mmol/L    Chloride 100 98 - 107 mmol/L    Carbon Dioxide (CO2) 27 22 - 29 mmol/L    Anion Gap 12 7 - 15 mmol/L    Urea Nitrogen 6.4 6.0 - 20.0 mg/dL    Creatinine 0.85 0.67 - 1.17 mg/dL    GFR Estimate >90 >60 mL/min/1.73m2    Calcium 9.4 8.8 - 10.4 mg/dL    Glucose 119 (H) 70 - 99 mg/dL   Result Value Ref Range    Troponin T, High Sensitivity 7 <=22 ng/L       EKG     EKG reviewed and independently interpreted as below;  Sinus bradycardia, rate 51.  .  QRS 90.  QTc 387.  No STEMI.    Procedures          Agus Bolton MD  07/31/25 0701

## 2025-07-31 NOTE — ED TRIAGE NOTES
Pt states upper back pain 8/10.  Pt states pain is stemming from left inner shoulder blade.  Pt states pain radiates down into left arm and hand.

## 2025-08-01 ENCOUNTER — OFFICE VISIT (OUTPATIENT)
Dept: PHYSICAL MEDICINE AND REHAB | Facility: CLINIC | Age: 45
End: 2025-08-01
Attending: STUDENT IN AN ORGANIZED HEALTH CARE EDUCATION/TRAINING PROGRAM
Payer: COMMERCIAL

## 2025-08-01 DIAGNOSIS — M54.12 CERVICAL RADICULOPATHY: ICD-10-CM

## 2025-08-01 LAB
ATRIAL RATE - MUSE: 51 BPM
DIASTOLIC BLOOD PRESSURE - MUSE: NORMAL MMHG
INTERPRETATION ECG - MUSE: NORMAL
P AXIS - MUSE: 50 DEGREES
PR INTERVAL - MUSE: 152 MS
QRS DURATION - MUSE: 90 MS
QT - MUSE: 420 MS
QTC - MUSE: 387 MS
R AXIS - MUSE: -2 DEGREES
SYSTOLIC BLOOD PRESSURE - MUSE: NORMAL MMHG
T AXIS - MUSE: 50 DEGREES
VENTRICULAR RATE- MUSE: 51 BPM

## 2025-08-01 PROCEDURE — 1125F AMNT PAIN NOTED PAIN PRSNT: CPT | Performed by: NURSE PRACTITIONER

## 2025-08-01 PROCEDURE — 99204 OFFICE O/P NEW MOD 45 MIN: CPT | Performed by: NURSE PRACTITIONER

## 2025-08-01 RX ORDER — GABAPENTIN 300 MG/1
CAPSULE ORAL
Qty: 180 CAPSULE | Refills: 0 | Status: SHIPPED | OUTPATIENT
Start: 2025-08-01

## 2025-08-01 RX ORDER — CYCLOBENZAPRINE HCL 10 MG
10 TABLET ORAL 3 TIMES DAILY PRN
Qty: 45 TABLET | Refills: 0 | Status: SHIPPED | OUTPATIENT
Start: 2025-08-01

## 2025-08-01 RX ORDER — DICLOFENAC SODIUM 75 MG/1
75 TABLET, DELAYED RELEASE ORAL 2 TIMES DAILY PRN
Qty: 60 TABLET | Refills: 0 | Status: SHIPPED | OUTPATIENT
Start: 2025-08-01

## 2025-08-01 ASSESSMENT — PAIN SCALES - GENERAL: PAINLEVEL_OUTOF10: SEVERE PAIN (8)

## 2025-08-03 ENCOUNTER — HOSPITAL ENCOUNTER (OUTPATIENT)
Dept: MRI IMAGING | Facility: HOSPITAL | Age: 45
Discharge: HOME | End: 2025-08-03
Attending: NURSE PRACTITIONER | Admitting: NURSE PRACTITIONER
Payer: COMMERCIAL

## 2025-08-03 DIAGNOSIS — M54.12 CERVICAL RADICULOPATHY: ICD-10-CM

## 2025-08-03 PROCEDURE — 255N000002 HC RX 255 OP 636: Performed by: NURSE PRACTITIONER

## 2025-08-03 PROCEDURE — 72156 MRI NECK SPINE W/O & W/DYE: CPT

## 2025-08-03 PROCEDURE — A9585 GADOBUTROL INJECTION: HCPCS | Performed by: NURSE PRACTITIONER

## 2025-08-03 RX ORDER — GADOBUTROL 604.72 MG/ML
7 INJECTION INTRAVENOUS ONCE
Status: COMPLETED | OUTPATIENT
Start: 2025-08-03 | End: 2025-08-03

## 2025-08-03 RX ADMIN — GADOBUTROL 7 ML: 604.72 INJECTION INTRAVENOUS at 08:12

## 2025-08-04 ENCOUNTER — TELEPHONE (OUTPATIENT)
Dept: PHYSICAL MEDICINE AND REHAB | Facility: CLINIC | Age: 45
End: 2025-08-04
Payer: COMMERCIAL

## 2025-08-07 LAB
PATH REPORT.COMMENTS IMP SPEC: NORMAL
PATH REPORT.FINAL DX SPEC: NORMAL
PATH REPORT.GROSS SPEC: NORMAL
PATH REPORT.MICROSCOPIC SPEC OTHER STN: NORMAL
PATH REPORT.MICROSCOPIC SPEC OTHER STN: NORMAL
PATH REPORT.RELEVANT HX SPEC: NORMAL
PHOTO IMAGE: NORMAL

## 2025-09-02 ENCOUNTER — HOSPITAL ENCOUNTER (OUTPATIENT)
Dept: CT IMAGING | Facility: HOSPITAL | Age: 45
Discharge: HOME | End: 2025-09-02
Attending: INTERNAL MEDICINE | Admitting: INTERNAL MEDICINE
Payer: COMMERCIAL

## 2025-09-02 VITALS
DIASTOLIC BLOOD PRESSURE: 71 MMHG | SYSTOLIC BLOOD PRESSURE: 109 MMHG | HEART RATE: 69 BPM | TEMPERATURE: 97.7 F | RESPIRATION RATE: 16 BRPM | OXYGEN SATURATION: 98 %

## 2025-09-02 DIAGNOSIS — R59.1 LYMPHADENOPATHY: Primary | ICD-10-CM

## 2025-09-02 LAB
HGB BLD-MCNC: 14.2 G/DL (ref 13.3–17.7)
INR PPP: 0.96 (ref 0.85–1.15)
MCV RBC AUTO: 79.4 FL (ref 78–100)
MCV RBC AUTO: 79.4 FL (ref 78–100)
PLATELET # BLD AUTO: 179 10E3/UL (ref 150–450)
PROTHROMBIN TIME: 13.1 SECONDS (ref 11.8–14.8)

## 2025-09-02 PROCEDURE — 85018 HEMOGLOBIN: CPT | Performed by: RADIOLOGY

## 2025-09-02 PROCEDURE — 88184 FLOWCYTOMETRY/ TC 1 MARKER: CPT | Performed by: INTERNAL MEDICINE

## 2025-09-02 PROCEDURE — 36415 COLL VENOUS BLD VENIPUNCTURE: CPT | Performed by: RADIOLOGY

## 2025-09-02 PROCEDURE — 250N000011 HC RX IP 250 OP 636: Performed by: RADIOLOGY

## 2025-09-02 PROCEDURE — 272N000717 CT LYMPH NODE BIOPSY

## 2025-09-02 PROCEDURE — 85049 AUTOMATED PLATELET COUNT: CPT | Performed by: RADIOLOGY

## 2025-09-02 PROCEDURE — 85610 PROTHROMBIN TIME: CPT | Performed by: RADIOLOGY

## 2025-09-02 RX ORDER — NALOXONE HYDROCHLORIDE 0.4 MG/ML
0.4 INJECTION, SOLUTION INTRAMUSCULAR; INTRAVENOUS; SUBCUTANEOUS
Status: DISCONTINUED | OUTPATIENT
Start: 2025-09-02 | End: 2025-09-03 | Stop reason: HOSPADM

## 2025-09-02 RX ORDER — NALOXONE HYDROCHLORIDE 0.4 MG/ML
0.2 INJECTION, SOLUTION INTRAMUSCULAR; INTRAVENOUS; SUBCUTANEOUS
Status: DISCONTINUED | OUTPATIENT
Start: 2025-09-02 | End: 2025-09-03 | Stop reason: HOSPADM

## 2025-09-02 RX ORDER — FENTANYL CITRATE 50 UG/ML
25-50 INJECTION, SOLUTION INTRAMUSCULAR; INTRAVENOUS EVERY 5 MIN PRN
Refills: 0 | Status: DISCONTINUED | OUTPATIENT
Start: 2025-09-02 | End: 2025-09-03 | Stop reason: HOSPADM

## 2025-09-02 RX ORDER — ACETAMINOPHEN 325 MG/1
650 TABLET ORAL EVERY 4 HOURS PRN
OUTPATIENT
Start: 2025-09-02

## 2025-09-02 RX ORDER — FLUMAZENIL 0.1 MG/ML
0.2 INJECTION, SOLUTION INTRAVENOUS
Status: DISCONTINUED | OUTPATIENT
Start: 2025-09-02 | End: 2025-09-03 | Stop reason: HOSPADM

## 2025-09-02 RX ADMIN — MIDAZOLAM HYDROCHLORIDE 1 MG: 1 INJECTION, SOLUTION INTRAMUSCULAR; INTRAVENOUS at 09:32

## 2025-09-02 RX ADMIN — FENTANYL CITRATE 50 MCG: 50 INJECTION INTRAMUSCULAR; INTRAVENOUS at 09:34

## 2025-09-02 RX ADMIN — MIDAZOLAM HYDROCHLORIDE 0.5 MG: 1 INJECTION, SOLUTION INTRAMUSCULAR; INTRAVENOUS at 09:44

## 2025-09-02 RX ADMIN — FENTANYL CITRATE 25 MCG: 50 INJECTION INTRAMUSCULAR; INTRAVENOUS at 09:47

## 2025-09-03 LAB
PATH REPORT.COMMENTS IMP SPEC: NORMAL
PATH REPORT.FINAL DX SPEC: NORMAL
PATH REPORT.MICROSCOPIC SPEC OTHER STN: NORMAL
PATH REPORT.RELEVANT HX SPEC: NORMAL

## (undated) RX ORDER — FENTANYL CITRATE 50 UG/ML
INJECTION, SOLUTION INTRAMUSCULAR; INTRAVENOUS
Status: DISPENSED
Start: 2025-07-22

## (undated) RX ORDER — FENTANYL CITRATE 50 UG/ML
INJECTION, SOLUTION INTRAMUSCULAR; INTRAVENOUS
Status: DISPENSED
Start: 2025-09-02